# Patient Record
Sex: FEMALE | Race: WHITE | NOT HISPANIC OR LATINO | Employment: OTHER | URBAN - METROPOLITAN AREA
[De-identification: names, ages, dates, MRNs, and addresses within clinical notes are randomized per-mention and may not be internally consistent; named-entity substitution may affect disease eponyms.]

---

## 2017-10-25 PROBLEM — I51.9 DECREASED LEFT VENTRICULAR FUNCTION: Status: ACTIVE | Noted: 2017-10-25

## 2017-11-08 PROBLEM — I25.10 CORONARY ARTERY DISEASE, NON-OCCLUSIVE: Status: ACTIVE | Noted: 2017-11-08

## 2017-11-08 PROBLEM — R94.30 ELEVATED LEFT VENTRICULAR END-DIASTOLIC PRESSURE (LVEDP): Status: ACTIVE | Noted: 2017-11-08

## 2018-11-27 PROBLEM — R33.9 RETENTION OF URINE, UNSPECIFIED: Status: ACTIVE | Noted: 2018-11-27

## 2018-11-27 PROBLEM — R15.9 FECAL INCONTINENCE: Status: ACTIVE | Noted: 2018-11-27

## 2019-04-26 ENCOUNTER — TELEPHONE (OUTPATIENT)
Dept: FAMILY MEDICINE | Facility: CLINIC | Age: 74
End: 2019-04-26

## 2019-04-26 DIAGNOSIS — M81.0 AGE-RELATED OSTEOPOROSIS WITHOUT CURRENT PATHOLOGICAL FRACTURE: Primary | ICD-10-CM

## 2019-04-26 NOTE — TELEPHONE ENCOUNTER
----- Message from Anamika Lopez sent at 4/26/2019  4:15 PM CDT -----  Contact:  DR. Swain of  [facility]   Dr. Swain of [facility] need to speak to nurse in office regarding patient       Please call Dr. Swain [phone]

## 2019-04-26 NOTE — TELEPHONE ENCOUNTER
Callback to Dr Swain, mutual patient had a recent serious fracture and they are screening for osteoporosis, states patient needs bone mineral density scan (has not had 1 in more than 2 years)    Asking if we do order then please call and let Dr Swain know.    Orders pended for your signature if you agree with recommendation

## 2019-06-24 ENCOUNTER — PATIENT OUTREACH (OUTPATIENT)
Dept: ADMINISTRATIVE | Facility: HOSPITAL | Age: 74
End: 2019-06-24

## 2019-06-24 NOTE — PROGRESS NOTES
Health Maintenance Due   Topic Date Due    Hepatitis C Screening  1945    High Dose Statin  11/29/1968    Shingles Vaccine (1 of 2) 11/29/1997    LDCT Lung Screen  11/29/2002    Pneumococcal Vaccine (65+ Low/Medium Risk) (1 of 2 - PCV13) 11/29/2012    DEXA SCAN  07/12/2019     Chart review completed 06/24/2019  Future Appointments   Date Time Provider Department Center   7/8/2019  1:40 PM Rajat Alonzo MD [facility]   12/6/2019 11:30 AM Bertrand Greenberg III, MD [facility]ARD [facility] Card

## 2019-07-25 ENCOUNTER — TELEPHONE (OUTPATIENT)
Dept: FAMILY MEDICINE | Facility: CLINIC | Age: 74
End: 2019-07-25

## 2019-07-25 NOTE — TELEPHONE ENCOUNTER
----- Message from Arias Denney sent at 7/25/2019  2:50 PM CDT -----  Contact: patient  Type:  Patient Returning Call    Who Called:  patient  Who Left Message for Patient:  Mago  Does the patient know what this is regarding?:  Not sure  Best Call Back Number:  [phone]  Additional Information:  Requesting a call back

## 2019-09-06 ENCOUNTER — PATIENT OUTREACH (OUTPATIENT)
Dept: ADMINISTRATIVE | Facility: HOSPITAL | Age: 74
End: 2019-09-06

## 2019-09-19 ENCOUNTER — OFFICE VISIT (OUTPATIENT)
Dept: FAMILY MEDICINE | Facility: CLINIC | Age: 74
End: 2019-09-19
Payer: MEDICARE

## 2019-09-19 VITALS
SYSTOLIC BLOOD PRESSURE: 130 MMHG | DIASTOLIC BLOOD PRESSURE: 70 MMHG | HEART RATE: 59 BPM | OXYGEN SATURATION: 96 % | RESPIRATION RATE: 16 BRPM | TEMPERATURE: 98 F | WEIGHT: 150.81 LBS | HEIGHT: 64 IN | BODY MASS INDEX: 25.75 KG/M2

## 2019-09-19 DIAGNOSIS — Z78.0 MENOPAUSE: ICD-10-CM

## 2019-09-19 DIAGNOSIS — J44.9 CHRONIC OBSTRUCTIVE PULMONARY DISEASE, UNSPECIFIED COPD TYPE: ICD-10-CM

## 2019-09-19 DIAGNOSIS — E03.4 HYPOTHYROIDISM DUE TO ACQUIRED ATROPHY OF THYROID: ICD-10-CM

## 2019-09-19 DIAGNOSIS — I42.8 NICM (NONISCHEMIC CARDIOMYOPATHY): ICD-10-CM

## 2019-09-19 DIAGNOSIS — E78.00 HYPERCHOLESTEROLEMIA: ICD-10-CM

## 2019-09-19 DIAGNOSIS — E21.3 HYPERPARATHYROIDISM, UNSPECIFIED: ICD-10-CM

## 2019-09-19 DIAGNOSIS — I10 ESSENTIAL HYPERTENSION: ICD-10-CM

## 2019-09-19 DIAGNOSIS — Z00.00 WELLNESS EXAMINATION: Primary | ICD-10-CM

## 2019-09-19 PROCEDURE — 3075F PR MOST RECENT SYSTOLIC BLOOD PRESS GE 130-139MM HG: ICD-10-PCS | Mod: S$GLB,,, | Performed by: INTERNAL MEDICINE

## 2019-09-19 PROCEDURE — 3078F PR MOST RECENT DIASTOLIC BLOOD PRESSURE < 80 MM HG: ICD-10-PCS | Mod: S$GLB,,, | Performed by: INTERNAL MEDICINE

## 2019-09-19 PROCEDURE — 3078F DIAST BP <80 MM HG: CPT | Mod: S$GLB,,, | Performed by: INTERNAL MEDICINE

## 2019-09-19 PROCEDURE — 99999 PR PBB SHADOW E&M-EST. PATIENT-LVL IV: ICD-10-PCS | Mod: PBBFAC,,, | Performed by: INTERNAL MEDICINE

## 2019-09-19 PROCEDURE — 3075F SYST BP GE 130 - 139MM HG: CPT | Mod: S$GLB,,, | Performed by: INTERNAL MEDICINE

## 2019-09-19 PROCEDURE — 99397 PER PM REEVAL EST PAT 65+ YR: CPT | Mod: S$GLB,,, | Performed by: INTERNAL MEDICINE

## 2019-09-19 PROCEDURE — 99397 PR PREVENTIVE VISIT,EST,65 & OVER: ICD-10-PCS | Mod: S$GLB,,, | Performed by: INTERNAL MEDICINE

## 2019-09-19 PROCEDURE — 99999 PR PBB SHADOW E&M-EST. PATIENT-LVL IV: CPT | Mod: PBBFAC,,, | Performed by: INTERNAL MEDICINE

## 2019-09-19 NOTE — PROGRESS NOTES
Patient ID: Gretta Landry     Chief Complaint:   Chief Complaint   Patient presents with    Follow-up        HPI: New Patient to [facility] but known to me. Wellness exam. Complains of cough and fatigue and now epistaxis since starting Coreg. I have contacted Dr. Greenberg who recommended switching to Metoprolol. I think she's getting too much B-blocker, so we will cut it in 1/2. Needs labs. We discussed LDLCT for lung cancer screening, but will address later. Past Medical History of hypercalcemia- could be from HCTZ. Will repeat labs and consider stopping it.     Review of Systems   Constitutional: Negative.  Negative for activity change and unexpected weight change.   HENT: Positive for rhinorrhea. Negative for hearing loss and trouble swallowing.    Eyes: Negative.  Negative for discharge and visual disturbance.   Respiratory: Negative.  Negative for chest tightness and wheezing.    Cardiovascular: Negative.  Negative for chest pain and palpitations.   Gastrointestinal: Negative.  Negative for blood in stool, constipation, diarrhea and vomiting.   Endocrine: Negative.  Negative for polydipsia and polyuria.   Genitourinary: Negative.  Negative for difficulty urinating, dysuria, hematuria and menstrual problem.   Musculoskeletal: Negative.  Negative for arthralgias, joint swelling and neck pain.   Skin: Negative.    Allergic/Immunologic: Negative.    Neurological: Negative.  Negative for weakness and headaches.   Hematological: Negative.    Psychiatric/Behavioral: Negative.  Negative for confusion and dysphoric mood.   All other systems reviewed and are negative.         Objective:      Physical Exam   Physical Exam   Constitutional: She is oriented to person, place, and time. She appears well-developed and well-nourished.   HENT:   Head: Normocephalic and atraumatic.   Nose: Nose normal.   Mouth/Throat: Oropharynx is clear and moist.   Eyes: Pupils are equal, round, and reactive to light. Conjunctivae and EOM are  normal.   Neck: Normal range of motion. Neck supple.   Cardiovascular: Regular rhythm, normal heart sounds and intact distal pulses.   bradycardia   Pulmonary/Chest: Effort normal and breath sounds normal.   Abdominal: Soft. Bowel sounds are normal.   Musculoskeletal: Normal range of motion.   Neurological: She is alert and oriented to person, place, and time.   Skin: Skin is warm and dry. Capillary refill takes less than 2 seconds.   Psychiatric: She has a normal mood and affect. Her behavior is normal. Judgment and thought content normal.   Nursing note and vitals reviewed.       Current Outpatient Medications:     ascorbic acid, vitamin C, (VITAMIN C) 1000 MG tablet, Take 500 mg by mouth once daily., Disp: , Rfl:     aspirin (ECOTRIN) 81 MG EC tablet, Take 81 mg by mouth once daily., Disp: , Rfl:     carvedilol (COREG) 12.5 MG tablet, Take 1 tablet (12.5 mg total) by mouth 2 (two) times daily with meals., Disp: 60 tablet, Rfl: 11    cholecalciferol, vitamin D3, 10,000 unit Tab, Take 10,000 Units by mouth once a week. , Disp: , Rfl:     fish oil-omega-3 fatty acids 300-1,000 mg capsule, Take 2 g by mouth every other day. , Disp: , Rfl:     glucosamine-chondroitin 500-400 mg tablet, Take 1 tablet by mouth as needed., Disp: , Rfl:     hydroCHLOROthiazide (HYDRODIURIL) 25 MG tablet, Take 1 tablet (25 mg total) by mouth once daily., Disp: 30 tablet, Rfl: 11    levothyroxine (SYNTHROID) 25 MCG tablet, Take 1 tablet (25 mcg total) by mouth before breakfast., Disp: 90 tablet, Rfl: 0    magnesium oxide (MAG-OX) 400 mg tablet, Take 400 mg by mouth every evening. , Disp: , Rfl:     meclizine (ANTIVERT) 25 mg tablet, Take 1 tablet (25 mg total) by mouth 3 (three) times daily as needed for Dizziness or Nausea (VERTIGO)., Disp: 20 tablet, Rfl: 0    niacin 500 MG CpSR, Take 1,000 mg by mouth every other day. , Disp: , Rfl:     valsartan (DIOVAN) 320 MG tablet, Take 1 tablet (320 mg total) by mouth once daily., Disp:  "90 tablet, Rfl: 3  No current facility-administered medications for this visit.         Vitals:   Vitals:    09/19/19 1406   BP: 130/70   BP Location: Right arm   Patient Position: Sitting   BP Method: Large (Manual)   Pulse: (!) 59   Resp: 16   Temp: 98.4 °F (36.9 °C)   SpO2: 96%   Weight: 68.4 kg (150 lb 12.7 oz)   Height: 5' 4" (1.626 m)      Assessment:       Patient Active Problem List    Diagnosis Date Noted    Hyperparathyroidism, unspecified 09/19/2019    c Hypercholesterolemia     Retention of urine, unspecified 11/27/2018    Fecal incontinence 11/27/2018    Vitamin D deficiency     Coronary artery disease, non-occlusive 11/08/2017    Elevated left ventricular end-diastolic pressure (LVEDP) 11/08/2017    Chronic Obstructive Pulmonary Disease     Bilateral Lower Extremity Varicose Veins     Family H/O Colon Cancer     H/O 1 PPD X 30 YRs TUD, Quit In 2006     Hypertension     Hypothyroidism     Frequent Urinary Tract Infections     GERD     H/O Right Wrist Fracture Repair In 11/2015     Osteoporosis     Nonischemic Cardiomyopathy With EF 40-45%     Left Bundle Branch Block     Allergic rhinosinusitis     H/O Bladder Suspension Surgery With Sling In 2014     Sigmoid And Descending Colon Diverticulosis     H/O Colon Polyps On Previous TC     Decreased left ventricular function 10/25/2017    Lower extremity weakness 06/27/2016    Distal radius fracture 11/10/2015          Plan:       Gretta was seen today for follow-up.    Diagnoses and all orders for this visit:    Wellness examination    c Hypercholesterolemia  -     Lipid panel; Future  -     Comprehensive metabolic panel; Future    Hyperparathyroidism, unspecified  Monitor     NICM (nonischemic cardiomyopathy)  Per [facility], decrease Coreg to 6.25 mg twice daily     Chronic obstructive pulmonary disease, unspecified COPD type  Consider Albuterol inhaler if needed   Consider Low Dose Lung CT for Lung Cancer Screening     Essential " hypertension  -     CBC auto differential; Future  -     Hepatitis C antibody; Future  Controlled     Menopause  -     DXA Bone Density Spine And Hip; Future    Hypothyroidism due to acquired atrophy of thyroid  -     TSH; Future  -     T3, free; Future  -     T4, free; Future

## 2019-10-01 ENCOUNTER — HOSPITAL ENCOUNTER (OUTPATIENT)
Dept: RADIOLOGY | Facility: HOSPITAL | Age: 74
Discharge: HOME | End: 2019-10-01
Attending: INTERNAL MEDICINE
Payer: MEDICARE

## 2019-10-01 DIAGNOSIS — Z78.0 MENOPAUSE: ICD-10-CM

## 2019-10-01 PROCEDURE — 77080 DXA BONE DENSITY AXIAL: CPT | Mod: TC,PO

## 2019-10-01 PROCEDURE — 77080 DXA BONE DENSITY AXIAL: CPT | Mod: 26,,, | Performed by: RADIOLOGY

## 2019-10-01 PROCEDURE — 77080 DEXA BONE DENSITY SPINE HIP: ICD-10-PCS | Mod: 26,,, | Performed by: RADIOLOGY

## 2019-10-03 ENCOUNTER — PATIENT MESSAGE (OUTPATIENT)
Dept: FAMILY MEDICINE | Facility: CLINIC | Age: 74
End: 2019-10-03

## 2019-10-11 ENCOUNTER — TELEPHONE (OUTPATIENT)
Dept: FAMILY MEDICINE | Facility: CLINIC | Age: 74
End: 2019-10-11

## 2019-10-12 NOTE — TELEPHONE ENCOUNTER
Thankfully your previously high calcium is now normal. In fact, Fosamax can lower calcium. I haven't heard about the association with a stroke. I also want to be careful and prevent further bone loss.

## 2019-10-12 NOTE — TELEPHONE ENCOUNTER
----- Message from Krissy L. Sandifer, LPN sent at 10/8/2019  9:49 AM CDT -----  Notes recorded by Alesha Barnett MA on 10/7/2019 at 11:20 AM CDT  Spoke with PT about results.Pt states she does not want to start fosamax because of the high calcium and she states she does not want to have stroke so she wants to be extra careful.

## 2019-10-14 NOTE — TELEPHONE ENCOUNTER
That's a lot of calcium to take each day. I only suggest Calcium 1200 mg / day + Vit D 2000 units / day.

## 2019-11-14 ENCOUNTER — PES CALL (OUTPATIENT)
Dept: ADMINISTRATIVE | Facility: CLINIC | Age: 74
End: 2019-11-14

## 2020-05-05 ENCOUNTER — PATIENT MESSAGE (OUTPATIENT)
Dept: ADMINISTRATIVE | Facility: HOSPITAL | Age: 75
End: 2020-05-05

## 2021-01-20 ENCOUNTER — IMMUNIZATION (OUTPATIENT)
Dept: FAMILY MEDICINE | Facility: CLINIC | Age: 76
End: 2021-01-20
Payer: MEDICARE

## 2021-01-20 DIAGNOSIS — Z23 NEED FOR VACCINATION: Primary | ICD-10-CM

## 2021-01-20 PROCEDURE — 91300 COVID-19, MRNA, LNP-S, PF, 30 MCG/0.3 ML DOSE VACCINE: CPT | Mod: PBBFAC,PO

## 2021-02-10 ENCOUNTER — IMMUNIZATION (OUTPATIENT)
Dept: FAMILY MEDICINE | Facility: CLINIC | Age: 76
End: 2021-02-10
Payer: MEDICARE

## 2021-02-10 DIAGNOSIS — Z23 NEED FOR VACCINATION: Primary | ICD-10-CM

## 2021-02-10 PROCEDURE — 91300 COVID-19, MRNA, LNP-S, PF, 30 MCG/0.3 ML DOSE VACCINE: CPT | Mod: PBBFAC,PO

## 2021-02-10 PROCEDURE — 0002A COVID-19, MRNA, LNP-S, PF, 30 MCG/0.3 ML DOSE VACCINE: CPT | Mod: PBBFAC,PO

## 2021-03-12 PROBLEM — I51.89 DIASTOLIC DYSFUNCTION: Status: ACTIVE | Noted: 2017-11-08

## 2021-03-17 PROBLEM — G25.2 RESTING TREMOR: Status: ACTIVE | Noted: 2021-03-17

## 2021-04-27 PROBLEM — I51.9 DECREASED LEFT VENTRICULAR FUNCTION: Status: RESOLVED | Noted: 2017-10-25 | Resolved: 2021-04-27

## 2021-10-06 ENCOUNTER — IMMUNIZATION (OUTPATIENT)
Dept: FAMILY MEDICINE | Facility: CLINIC | Age: 76
End: 2021-10-06
Payer: MEDICARE

## 2021-10-06 DIAGNOSIS — Z23 NEED FOR VACCINATION: Primary | ICD-10-CM

## 2021-10-06 PROCEDURE — 91300 COVID-19, MRNA, LNP-S, PF, 30 MCG/0.3 ML DOSE VACCINE: CPT | Mod: PBBFAC | Performed by: FAMILY MEDICINE

## 2021-10-06 PROCEDURE — 0003A COVID-19, MRNA, LNP-S, PF, 30 MCG/0.3 ML DOSE VACCINE: CPT | Mod: PBBFAC | Performed by: FAMILY MEDICINE

## 2023-07-06 ENCOUNTER — OFFICE VISIT (OUTPATIENT)
Dept: PAIN MEDICINE | Facility: CLINIC | Age: 78
End: 2023-07-06
Payer: MEDICARE

## 2023-07-06 ENCOUNTER — HOSPITAL ENCOUNTER (OUTPATIENT)
Dept: RADIOLOGY | Facility: HOSPITAL | Age: 78
Discharge: HOME | End: 2023-07-06
Attending: ANESTHESIOLOGY
Payer: MEDICARE

## 2023-07-06 VITALS
HEIGHT: 62 IN | WEIGHT: 152.13 LBS | SYSTOLIC BLOOD PRESSURE: 158 MMHG | DIASTOLIC BLOOD PRESSURE: 72 MMHG | HEART RATE: 80 BPM | BODY MASS INDEX: 27.99 KG/M2

## 2023-07-06 DIAGNOSIS — M54.2 CERVICALGIA: ICD-10-CM

## 2023-07-06 DIAGNOSIS — M54.9 DORSALGIA, UNSPECIFIED: ICD-10-CM

## 2023-07-06 DIAGNOSIS — M54.9 DORSALGIA, UNSPECIFIED: Primary | ICD-10-CM

## 2023-07-06 PROCEDURE — 3077F SYST BP >= 140 MM HG: CPT | Mod: CPTII,S$GLB,, | Performed by: ANESTHESIOLOGY

## 2023-07-06 PROCEDURE — 3077F PR MOST RECENT SYSTOLIC BLOOD PRESSURE >= 140 MM HG: ICD-10-PCS | Mod: CPTII,S$GLB,, | Performed by: ANESTHESIOLOGY

## 2023-07-06 PROCEDURE — 3078F PR MOST RECENT DIASTOLIC BLOOD PRESSURE < 80 MM HG: ICD-10-PCS | Mod: CPTII,S$GLB,, | Performed by: ANESTHESIOLOGY

## 2023-07-06 PROCEDURE — 3288F PR FALLS RISK ASSESSMENT DOCUMENTED: ICD-10-PCS | Mod: CPTII,S$GLB,, | Performed by: ANESTHESIOLOGY

## 2023-07-06 PROCEDURE — 3288F FALL RISK ASSESSMENT DOCD: CPT | Mod: CPTII,S$GLB,, | Performed by: ANESTHESIOLOGY

## 2023-07-06 PROCEDURE — 99204 OFFICE O/P NEW MOD 45 MIN: CPT | Mod: S$GLB,,, | Performed by: ANESTHESIOLOGY

## 2023-07-06 PROCEDURE — 72114 XR LUMBAR SPINE 5 VIEW WITH FLEX AND EXT: ICD-10-PCS | Mod: 26,,, | Performed by: RADIOLOGY

## 2023-07-06 PROCEDURE — 99204 PR OFFICE/OUTPT VISIT, NEW, LEVL IV, 45-59 MIN: ICD-10-PCS | Mod: S$GLB,,, | Performed by: ANESTHESIOLOGY

## 2023-07-06 PROCEDURE — 1160F RVW MEDS BY RX/DR IN RCRD: CPT | Mod: CPTII,S$GLB,, | Performed by: ANESTHESIOLOGY

## 2023-07-06 PROCEDURE — 1159F MED LIST DOCD IN RCRD: CPT | Mod: CPTII,S$GLB,, | Performed by: ANESTHESIOLOGY

## 2023-07-06 PROCEDURE — 3044F HG A1C LEVEL LT 7.0%: CPT | Mod: CPTII,S$GLB,, | Performed by: ANESTHESIOLOGY

## 2023-07-06 PROCEDURE — 3078F DIAST BP <80 MM HG: CPT | Mod: CPTII,S$GLB,, | Performed by: ANESTHESIOLOGY

## 2023-07-06 PROCEDURE — 99999 PR PBB SHADOW E&M-EST. PATIENT-LVL IV: CPT | Mod: PBBFAC,,, | Performed by: ANESTHESIOLOGY

## 2023-07-06 PROCEDURE — 3044F PR MOST RECENT HEMOGLOBIN A1C LEVEL <7.0%: ICD-10-PCS | Mod: CPTII,S$GLB,, | Performed by: ANESTHESIOLOGY

## 2023-07-06 PROCEDURE — 99999 PR PBB SHADOW E&M-EST. PATIENT-LVL IV: ICD-10-PCS | Mod: PBBFAC,,, | Performed by: ANESTHESIOLOGY

## 2023-07-06 PROCEDURE — 1101F PR PT FALLS ASSESS DOC 0-1 FALLS W/OUT INJ PAST YR: ICD-10-PCS | Mod: CPTII,S$GLB,, | Performed by: ANESTHESIOLOGY

## 2023-07-06 PROCEDURE — 72052 XR CERVICAL SPINE 5 VIEW WITH FLEX AND EXT: ICD-10-PCS | Mod: 26,,, | Performed by: RADIOLOGY

## 2023-07-06 PROCEDURE — 1101F PT FALLS ASSESS-DOCD LE1/YR: CPT | Mod: CPTII,S$GLB,, | Performed by: ANESTHESIOLOGY

## 2023-07-06 PROCEDURE — 1160F PR REVIEW ALL MEDS BY PRESCRIBER/CLIN PHARMACIST DOCUMENTED: ICD-10-PCS | Mod: CPTII,S$GLB,, | Performed by: ANESTHESIOLOGY

## 2023-07-06 PROCEDURE — 1159F PR MEDICATION LIST DOCUMENTED IN MEDICAL RECORD: ICD-10-PCS | Mod: CPTII,S$GLB,, | Performed by: ANESTHESIOLOGY

## 2023-07-06 PROCEDURE — 72052 X-RAY EXAM NECK SPINE 6/>VWS: CPT | Mod: TC,PO

## 2023-07-06 PROCEDURE — 72114 X-RAY EXAM L-S SPINE BENDING: CPT | Mod: TC,PO

## 2023-07-06 PROCEDURE — 72114 X-RAY EXAM L-S SPINE BENDING: CPT | Mod: 26,,, | Performed by: RADIOLOGY

## 2023-07-06 PROCEDURE — 1125F PR PAIN SEVERITY QUANTIFIED, PAIN PRESENT: ICD-10-PCS | Mod: CPTII,S$GLB,, | Performed by: ANESTHESIOLOGY

## 2023-07-06 PROCEDURE — 1125F AMNT PAIN NOTED PAIN PRSNT: CPT | Mod: CPTII,S$GLB,, | Performed by: ANESTHESIOLOGY

## 2023-07-06 PROCEDURE — 72052 X-RAY EXAM NECK SPINE 6/>VWS: CPT | Mod: 26,,, | Performed by: RADIOLOGY

## 2023-07-06 NOTE — H&P (VIEW-ONLY)
[facility] New Patient Evaluation      Referred by: Dr. Scott Denton    PCP:     CC:   Chief Complaint   Patient presents with    Back Pain    Mid-back Pain    Low-back Pain      No flowsheet data found.      HPI:   Gretta Landry is a 77 y.o. female patient who has a past medical history of a Left Bundle Branch Block, a Nonischemic Cardiomyopathy With EF 40-45%, b Hypertension, c Hypercholesterolemia, e Hypothyroidism, f Osteoporosis, i Chronic Obstructive Pulmonary Disease, i H/O 1 PPD X 30 YRs TUD, Quit In 2006, j Family H/O Colon Cancer, j GERD, j H/O Colon Polyps On Previous TC, j Sigmoid And Descending Colon Diverticulosis, k Frequent Urinary Tract Infections, k H/O Bladder Suspension Surgery With Sling In 2014, l H/O Right Wrist Fracture Repair In 11/2015, o Allergic Rhinosinusitis, q Bilateral Lower Extremity Varicose Veins, q Vitamin D Deficiency, Sinus congestion, and Wellness Visit 12/1/16. She presents with back pain.  Had chronic back pain for over 10 years.  She reports over the past 6-12 months it has been gradually worsening.  She reports pain that radiates down her left leg the top of the left foot.  Her pain is 6/10, constant, deep, burning.  Pain is worse with standing, bending, touching, walking, lifting and relieved with rest, lying down, medications.  She does take some tramadol but it only provides her with about 10% relief.  She reports she has some feelings of chronic weakness.  She can intermittently gets some numbness down her left arm.      Pain Intervention History:      Past Spine Surgical History:      Past and current medications:  Antineuropathics:  NSAIDs:  Physical therapy: yes, completed   Antidepressants:  Muscle relaxers:  Opioids: tramadol   Antiplatelets/Anticoagulants: aspirin     History:    Current Outpatient Medications:     aspirin (ECOTRIN) 81 MG EC tablet, Take 81 mg by mouth once daily., Disp: , Rfl:     cholecalciferol, vitamin D3, 10,000 unit Tab,  "Take 10,000 Units by mouth once a week. , Disp: , Rfl:     fish oil-omega-3 fatty acids 300-1,000 mg capsule, Take 2 g by mouth every other day. , Disp: , Rfl:     glucosamine-chondroitin 500-400 mg tablet, Take 1 tablet by mouth as needed., Disp: , Rfl:     meclizine (ANTIVERT) 25 mg tablet, Take 1 tablet (25 mg total) by mouth 3 (three) times daily as needed for Dizziness or Nausea (VERTIGO)., Disp: 20 tablet, Rfl: 0    niacin 500 MG CpSR, Take 1,000 mg by mouth 2 (two) times a day., Disp: , Rfl:     valsartan-hydrochlorothiazide (DIOVAN-HCT) 320-25 mg per tablet, Take 1 tablet by mouth once daily., Disp: 90 tablet, Rfl: 3    verapamiL (CALAN-SR) 120 MG CR tablet, Take 1 tablet (120 mg total) by mouth every evening., Disp: 90 tablet, Rfl: 3    levothyroxine (SYNTHROID) 25 MCG tablet, Take 1 tablet (25 mcg total) by mouth before breakfast., Disp: 90 tablet, Rfl: 0    Past Medical History:   Diagnosis Date    a Left Bundle Branch Block     Dr. Bertrand Greenberg    a Nonischemic Cardiomyopathy With EF 40-45%     Dr. Bertrand Greenberg; 10/25/17 LHC/Angiography = (See Report)    b Hypertension     5/29/18 RXd A Low Salt Diet; Norvasc Caused Severe Edema    c Hypercholesterolemia     On Niacin 500 Mg Daily    e Hypothyroidism     f Osteoporosis     i Chronic Obstructive Pulmonary Disease     i H/O 1 PPD X 30 YRs TUD, Quit In 2006     j Family H/O Colon Cancer     11/8/17 Referred To Dr. Fernie Grace; Her Sister    j GERD     11/8/17 Referred To Dr. Fernie Grace; Dr. Loretta Quiroz    j H/O Colon Polyps On Previous TC     11/8/17 Referred To Dr. Fernie Grace; Dr. Loretta Quiroz (After Her 1/21/10 TC Was Free Of Polyps): "Repeat TC In 5 YRs"    j Sigmoid And Descending Colon Diverticulosis     11/8/17 Referred To Dr. Fernie Grace; Dr. Loretta ledesma Frequent Urinary Tract Infections     k H/O Bladder Suspension Surgery With Sling In 2014     l H/O Right Wrist Fracture Repair In 11/2015     Dr. Dylan Packer; Injuries Due To A " Fall On 11/2/15    o Allergic Rhinosinusitis     q Bilateral Lower Extremity Varicose Veins     q Vitamin D Deficiency     On OTC D3 10K IU Daily    Sinus congestion     Wellness Visit 16     Was Performed By Dr. Teran       Past Surgical History:   Procedure Laterality Date    APPENDECTOMY      CARDIAC CATHETERIZATION  2006    LHC, no stents    CATARACT EXTRACTION, BILATERAL  2019    Cysto, bladder lift with sling and robotic sacral colpopexy-14      HYSTERECTOMY      IMPLANTATION OF PERMANENT SACRAL NERVE STIMULATOR N/A 2018    Procedure: INSERTION, NEUROSTIMULATOR, PERMANENT, SACRAL;  Surgeon: Jean Carlos Denton MD;  Location: [facility];  Service: Urology;  Laterality: N/A;    OOPHORECTOMY      right wrist      Fracture    VAGINAL HYSTERECTOMY W/ ANTERIOR AND POSTERIOR VAGINAL REPAIR      and BSO       Family History   Problem Relation Age of Onset    No Known Problems Mother     Heart disease Father     Hyperlipidemia Father     Hypertension Father     Stroke Father     Ovarian cancer Sister     Lung cancer Brother     Alcohol abuse Brother     Dementia Sister        Social History     Socioeconomic History    Marital status:    Tobacco Use    Smoking status: Former     Packs/day: 1.00     Years: 30.00     Pack years: 30.00     Types: Cigarettes     Quit date: 2006     Years since quittin.0    Smokeless tobacco: Never   Substance and Sexual Activity    Alcohol use: Yes     Alcohol/week: 1.0 standard drink     Types: 1 Glasses of wine per week     Comment: rare    Drug use: No    Sexual activity: Yes     Partners: Male     Birth control/protection: Post-menopausal       Review of patient's allergies indicates:   Allergen Reactions    Ace inhibitors Other (See Comments)     cough    Pneumococcal vaccine Rash       Review of Systems:  Positive for anxiety, depression.  Balance of review of systems is negative.    Physical Exam:  Vitals:    23 1010   BP: (!) 158/72   Pulse: 80  "  Weight: 69 kg (152 lb 1.9 oz)   Height: 5' 2" (1.575 m)   PainSc:   6   PainLoc: Back     Body mass index is 27.82 kg/m².    Gen: NAD  Psych: mood appropriate for given condition  HEENT: eyes anicteric   CV: RRR  HEENT: anicteric   Respiratory: non-labored, no signs of respiratory distress  Abd: non-distended  Skin: warm, dry and intact.  Gait: antalgic gait.     Sensory:  Intact and symmetrical to light touch in C4-T1 dermatomes bilaterally. Intact and symmetrical to light touch in L1-S1 dermatomes bilaterally.    Motor:    Right Left   C4 Shoulder Abduction  5  5   C5 Elbow Flexion    5  5   C6 Wrist Extension  5  5   C7 Elbow Extension   5  5   C8/T1 Hand Intrinsics   5  5        Right Left   L2/3 Iliacus Hip flexion  5  5   L3/4 Qudratus Femoris Knee Extension  5  5   L4/5 Tib Anterior Ankle Dorsiflexion   5  5   L5/S1 Extensor Hallicus Longus Great toe extension  5  5   S1/S2 Gastroc/Soleus Plantar Flexion  5  5      Right Left                  Patellar DTR 0 0   Achilles DTR 0 0   Zhou Absent  Absent                 Labs:  Lab Results   Component Value Date    HGBA1C 5.5 02/27/2023       Lab Results   Component Value Date    WBC 5.97 02/27/2023    HGB 11.6 (L) 02/27/2023    HCT 34.6 (L) 02/27/2023    MCV 93 02/27/2023     02/27/2023           Imaging:  CT cervical spine 4/24/21  FINDINGS:   No acute fracture or traumatic subluxation.    Vertebral bodies are normal in height. There is straightening of the normal cervical lordosis which is likely positional or due to muscular spasm.  Mild multilevel facet hypertrophy is present.     Assessment:   Problem List Items Addressed This Visit    None  Visit Diagnoses       Dorsalgia, unspecified    -  Primary    Relevant Orders    CT Lumbar Spine Without Contrast    X-Ray Lumbar Complete Including Flex And Ext    Cervicalgia        Relevant Orders    CT Cervical Spine Without Contrast    X-Ray Cervical Spine 5 View With Flex And Ext              Gretta H " Frantz is a 77 y.o. female patient who has a past medical history of a Left Bundle Branch Block, a Nonischemic Cardiomyopathy With EF 40-45%, b Hypertension, c Hypercholesterolemia, e Hypothyroidism, f Osteoporosis, i Chronic Obstructive Pulmonary Disease, i H/O 1 PPD X 30 YRs TUD, Quit In 2006, j Family H/O Colon Cancer, j GERD, j H/O Colon Polyps On Previous TC, j Sigmoid And Descending Colon Diverticulosis, k Frequent Urinary Tract Infections, k H/O Bladder Suspension Surgery With Sling In 2014, l H/O Right Wrist Fracture Repair In 11/2015, o Allergic Rhinosinusitis, q Bilateral Lower Extremity Varicose Veins, q Vitamin D Deficiency, Sinus congestion, and Wellness Visit 12/1/16. She presents with back pain.  Had chronic back pain for over 10 years.  She reports over the past 6-12 months it has been gradually worsening.  She reports pain that radiates down her left leg the top of the left foot.  Her pain is 6/10, constant, deep, burning.  Pain is worse with standing, bending, touching, walking, lifting and relieved with rest, lying down, medications.  She does take some tramadol but it only provides her with about 10% relief.  She reports she has some feelings of chronic weakness.  She can intermittently gets some numbness down her left arm.    - on exam she has full strength in her upper and lower extremities.  Intact sensation to light touch.  Mild pain with lumbar axial facet loading  - she is with her son and daughter today.  They both report as well as the patient reports that she has completed physical therapy multiple times without significant improvement in her symptoms  - I have ordered x-rays of her lumbar and cervical spine with flexion-extension to evaluate her bony anatomy and rule out any instability  - I think that her left-sided leg pain is likely lumbar radicular pain.  I have ordered a CT of her lumbar spine to better evaluate her neural anatomy as her bladder stimulator is not MRI compatible.  I  am also going to order a CT of her cervical spine to try and evaluate her neural anatomy given her complaints of intermittent left-sided arm numbness  - we will reach out once imaging complete to discuss further treatment options      : Not applicable    Marcell Hope M.D.  Interventional Pain Medicine / Anesthesiology    This note was completed with dictation software and grammatical errors may exist.

## 2023-07-06 NOTE — PROGRESS NOTES
[facility] New Patient Evaluation      Referred by: Dr. Scott Denton    PCP:     CC:   Chief Complaint   Patient presents with    Back Pain    Mid-back Pain    Low-back Pain      No flowsheet data found.      HPI:   Gretta Landry is a 77 y.o. female patient who has a past medical history of a Left Bundle Branch Block, a Nonischemic Cardiomyopathy With EF 40-45%, b Hypertension, c Hypercholesterolemia, e Hypothyroidism, f Osteoporosis, i Chronic Obstructive Pulmonary Disease, i H/O 1 PPD X 30 YRs TUD, Quit In 2006, j Family H/O Colon Cancer, j GERD, j H/O Colon Polyps On Previous TC, j Sigmoid And Descending Colon Diverticulosis, k Frequent Urinary Tract Infections, k H/O Bladder Suspension Surgery With Sling In 2014, l H/O Right Wrist Fracture Repair In 11/2015, o Allergic Rhinosinusitis, q Bilateral Lower Extremity Varicose Veins, q Vitamin D Deficiency, Sinus congestion, and Wellness Visit 12/1/16. She presents with back pain.  Had chronic back pain for over 10 years.  She reports over the past 6-12 months it has been gradually worsening.  She reports pain that radiates down her left leg the top of the left foot.  Her pain is 6/10, constant, deep, burning.  Pain is worse with standing, bending, touching, walking, lifting and relieved with rest, lying down, medications.  She does take some tramadol but it only provides her with about 10% relief.  She reports she has some feelings of chronic weakness.  She can intermittently gets some numbness down her left arm.      Pain Intervention History:      Past Spine Surgical History:      Past and current medications:  Antineuropathics:  NSAIDs:  Physical therapy: yes, completed   Antidepressants:  Muscle relaxers:  Opioids: tramadol   Antiplatelets/Anticoagulants: aspirin     History:    Current Outpatient Medications:     aspirin (ECOTRIN) 81 MG EC tablet, Take 81 mg by mouth once daily., Disp: , Rfl:     cholecalciferol, vitamin D3, 10,000 unit Tab,  "Take 10,000 Units by mouth once a week. , Disp: , Rfl:     fish oil-omega-3 fatty acids 300-1,000 mg capsule, Take 2 g by mouth every other day. , Disp: , Rfl:     glucosamine-chondroitin 500-400 mg tablet, Take 1 tablet by mouth as needed., Disp: , Rfl:     meclizine (ANTIVERT) 25 mg tablet, Take 1 tablet (25 mg total) by mouth 3 (three) times daily as needed for Dizziness or Nausea (VERTIGO)., Disp: 20 tablet, Rfl: 0    niacin 500 MG CpSR, Take 1,000 mg by mouth 2 (two) times a day., Disp: , Rfl:     valsartan-hydrochlorothiazide (DIOVAN-HCT) 320-25 mg per tablet, Take 1 tablet by mouth once daily., Disp: 90 tablet, Rfl: 3    verapamiL (CALAN-SR) 120 MG CR tablet, Take 1 tablet (120 mg total) by mouth every evening., Disp: 90 tablet, Rfl: 3    levothyroxine (SYNTHROID) 25 MCG tablet, Take 1 tablet (25 mcg total) by mouth before breakfast., Disp: 90 tablet, Rfl: 0    Past Medical History:   Diagnosis Date    a Left Bundle Branch Block     Dr. Bertrand Greenberg    a Nonischemic Cardiomyopathy With EF 40-45%     Dr. Bertrand Greenberg; 10/25/17 LHC/Angiography = (See Report)    b Hypertension     5/29/18 RXd A Low Salt Diet; Norvasc Caused Severe Edema    c Hypercholesterolemia     On Niacin 500 Mg Daily    e Hypothyroidism     f Osteoporosis     i Chronic Obstructive Pulmonary Disease     i H/O 1 PPD X 30 YRs TUD, Quit In 2006     j Family H/O Colon Cancer     11/8/17 Referred To Dr. Fernie Grace; Her Sister    j GERD     11/8/17 Referred To Dr. Fernie Grace; Dr. Loretta Quiroz    j H/O Colon Polyps On Previous TC     11/8/17 Referred To Dr. Fernie Grace; Dr. Loretta Quiroz (After Her 1/21/10 TC Was Free Of Polyps): "Repeat TC In 5 YRs"    j Sigmoid And Descending Colon Diverticulosis     11/8/17 Referred To Dr. Fernie Grace; Dr. Loretta ledesma Frequent Urinary Tract Infections     k H/O Bladder Suspension Surgery With Sling In 2014     l H/O Right Wrist Fracture Repair In 11/2015     Dr. Dylan Packer; Injuries Due To A " Fall On 11/2/15    o Allergic Rhinosinusitis     q Bilateral Lower Extremity Varicose Veins     q Vitamin D Deficiency     On OTC D3 10K IU Daily    Sinus congestion     Wellness Visit 16     Was Performed By Dr. Teran       Past Surgical History:   Procedure Laterality Date    APPENDECTOMY      CARDIAC CATHETERIZATION  2006    LHC, no stents    CATARACT EXTRACTION, BILATERAL  2019    Cysto, bladder lift with sling and robotic sacral colpopexy-14      HYSTERECTOMY      IMPLANTATION OF PERMANENT SACRAL NERVE STIMULATOR N/A 2018    Procedure: INSERTION, NEUROSTIMULATOR, PERMANENT, SACRAL;  Surgeon: Jean Carlos Denton MD;  Location: [facility];  Service: Urology;  Laterality: N/A;    OOPHORECTOMY      right wrist      Fracture    VAGINAL HYSTERECTOMY W/ ANTERIOR AND POSTERIOR VAGINAL REPAIR      and BSO       Family History   Problem Relation Age of Onset    No Known Problems Mother     Heart disease Father     Hyperlipidemia Father     Hypertension Father     Stroke Father     Ovarian cancer Sister     Lung cancer Brother     Alcohol abuse Brother     Dementia Sister        Social History     Socioeconomic History    Marital status:    Tobacco Use    Smoking status: Former     Packs/day: 1.00     Years: 30.00     Pack years: 30.00     Types: Cigarettes     Quit date: 2006     Years since quittin.0    Smokeless tobacco: Never   Substance and Sexual Activity    Alcohol use: Yes     Alcohol/week: 1.0 standard drink     Types: 1 Glasses of wine per week     Comment: rare    Drug use: No    Sexual activity: Yes     Partners: Male     Birth control/protection: Post-menopausal       Review of patient's allergies indicates:   Allergen Reactions    Ace inhibitors Other (See Comments)     cough    Pneumococcal vaccine Rash       Review of Systems:  Positive for anxiety, depression.  Balance of review of systems is negative.    Physical Exam:  Vitals:    23 1010   BP: (!) 158/72   Pulse: 80  "  Weight: 69 kg (152 lb 1.9 oz)   Height: 5' 2" (1.575 m)   PainSc:   6   PainLoc: Back     Body mass index is 27.82 kg/m².    Gen: NAD  Psych: mood appropriate for given condition  HEENT: eyes anicteric   CV: RRR  HEENT: anicteric   Respiratory: non-labored, no signs of respiratory distress  Abd: non-distended  Skin: warm, dry and intact.  Gait: antalgic gait.     Sensory:  Intact and symmetrical to light touch in C4-T1 dermatomes bilaterally. Intact and symmetrical to light touch in L1-S1 dermatomes bilaterally.    Motor:    Right Left   C4 Shoulder Abduction  5  5   C5 Elbow Flexion    5  5   C6 Wrist Extension  5  5   C7 Elbow Extension   5  5   C8/T1 Hand Intrinsics   5  5        Right Left   L2/3 Iliacus Hip flexion  5  5   L3/4 Qudratus Femoris Knee Extension  5  5   L4/5 Tib Anterior Ankle Dorsiflexion   5  5   L5/S1 Extensor Hallicus Longus Great toe extension  5  5   S1/S2 Gastroc/Soleus Plantar Flexion  5  5      Right Left                  Patellar DTR 0 0   Achilles DTR 0 0   Zhou Absent  Absent                 Labs:  Lab Results   Component Value Date    HGBA1C 5.5 02/27/2023       Lab Results   Component Value Date    WBC 5.97 02/27/2023    HGB 11.6 (L) 02/27/2023    HCT 34.6 (L) 02/27/2023    MCV 93 02/27/2023     02/27/2023           Imaging:  CT cervical spine 4/24/21  FINDINGS:   No acute fracture or traumatic subluxation.    Vertebral bodies are normal in height. There is straightening of the normal cervical lordosis which is likely positional or due to muscular spasm.  Mild multilevel facet hypertrophy is present.     Assessment:   Problem List Items Addressed This Visit    None  Visit Diagnoses       Dorsalgia, unspecified    -  Primary    Relevant Orders    CT Lumbar Spine Without Contrast    X-Ray Lumbar Complete Including Flex And Ext    Cervicalgia        Relevant Orders    CT Cervical Spine Without Contrast    X-Ray Cervical Spine 5 View With Flex And Ext              Gretta H " Frantz is a 77 y.o. female patient who has a past medical history of a Left Bundle Branch Block, a Nonischemic Cardiomyopathy With EF 40-45%, b Hypertension, c Hypercholesterolemia, e Hypothyroidism, f Osteoporosis, i Chronic Obstructive Pulmonary Disease, i H/O 1 PPD X 30 YRs TUD, Quit In 2006, j Family H/O Colon Cancer, j GERD, j H/O Colon Polyps On Previous TC, j Sigmoid And Descending Colon Diverticulosis, k Frequent Urinary Tract Infections, k H/O Bladder Suspension Surgery With Sling In 2014, l H/O Right Wrist Fracture Repair In 11/2015, o Allergic Rhinosinusitis, q Bilateral Lower Extremity Varicose Veins, q Vitamin D Deficiency, Sinus congestion, and Wellness Visit 12/1/16. She presents with back pain.  Had chronic back pain for over 10 years.  She reports over the past 6-12 months it has been gradually worsening.  She reports pain that radiates down her left leg the top of the left foot.  Her pain is 6/10, constant, deep, burning.  Pain is worse with standing, bending, touching, walking, lifting and relieved with rest, lying down, medications.  She does take some tramadol but it only provides her with about 10% relief.  She reports she has some feelings of chronic weakness.  She can intermittently gets some numbness down her left arm.    - on exam she has full strength in her upper and lower extremities.  Intact sensation to light touch.  Mild pain with lumbar axial facet loading  - she is with her son and daughter today.  They both report as well as the patient reports that she has completed physical therapy multiple times without significant improvement in her symptoms  - I have ordered x-rays of her lumbar and cervical spine with flexion-extension to evaluate her bony anatomy and rule out any instability  - I think that her left-sided leg pain is likely lumbar radicular pain.  I have ordered a CT of her lumbar spine to better evaluate her neural anatomy as her bladder stimulator is not MRI compatible.  I  am also going to order a CT of her cervical spine to try and evaluate her neural anatomy given her complaints of intermittent left-sided arm numbness  - we will reach out once imaging complete to discuss further treatment options      : Not applicable    Marcell Hope M.D.  Interventional Pain Medicine / Anesthesiology    This note was completed with dictation software and grammatical errors may exist.

## 2023-07-11 ENCOUNTER — HOSPITAL ENCOUNTER (OUTPATIENT)
Dept: RADIOLOGY | Facility: HOSPITAL | Age: 78
Discharge: HOME | End: 2023-07-11
Attending: ANESTHESIOLOGY
Payer: MEDICARE

## 2023-07-11 DIAGNOSIS — M54.9 DORSALGIA, UNSPECIFIED: ICD-10-CM

## 2023-07-11 DIAGNOSIS — M54.2 CERVICALGIA: ICD-10-CM

## 2023-07-11 PROCEDURE — 72125 CT CERVICAL SPINE WITHOUT CONTRAST: ICD-10-PCS | Mod: 26,,, | Performed by: RADIOLOGY

## 2023-07-11 PROCEDURE — 72125 CT NECK SPINE W/O DYE: CPT | Mod: TC,PO

## 2023-07-11 PROCEDURE — 72131 CT LUMBAR SPINE W/O DYE: CPT | Mod: TC,PO

## 2023-07-11 PROCEDURE — 72131 CT LUMBAR SPINE W/O DYE: CPT | Mod: 26,,, | Performed by: RADIOLOGY

## 2023-07-11 PROCEDURE — 72131 CT LUMBAR SPINE WITHOUT CONTRAST: ICD-10-PCS | Mod: 26,,, | Performed by: RADIOLOGY

## 2023-07-11 PROCEDURE — 72125 CT NECK SPINE W/O DYE: CPT | Mod: 26,,, | Performed by: RADIOLOGY

## 2023-07-18 ENCOUNTER — TELEPHONE (OUTPATIENT)
Dept: PAIN MEDICINE | Facility: CLINIC | Age: 78
End: 2023-07-18
Payer: MEDICARE

## 2023-07-18 NOTE — TELEPHONE ENCOUNTER
I spoke to Ms. Walden and reviewed her imaging.    We are going to schedule for a lumbar FUNMILAYO          Please schedule her for Monday July 31st        Physician - Dr Hope    Type of Procedure/Injection - Lumbar Epidural  L5/S1           Laterality - NA      Type of Sedation - Local      Need to hold medication - Yes      Aspirin for 7 days      Clearance needed - No      Follow up - 2 week

## 2023-07-20 DIAGNOSIS — M54.16 LUMBAR RADICULOPATHY: Primary | ICD-10-CM

## 2023-07-20 RX ORDER — ALPRAZOLAM 0.5 MG/1
0.5 TABLET, ORALLY DISINTEGRATING ORAL ONCE AS NEEDED
Status: CANCELLED | OUTPATIENT
Start: 2023-07-20 | End: 2034-12-16

## 2023-07-26 ENCOUNTER — TELEPHONE (OUTPATIENT)
Dept: SURGERY | Facility: HOSPITAL | Age: 78
End: 2023-07-26
Payer: MEDICARE

## 2023-07-26 NOTE — TELEPHONE ENCOUNTER
Good Afternoon,    I see that Ms. Glynn takes Fish-Oil, has she been instructed to hold this as well for the procedure? I see where she was instructed to hold her Aspirin but not the fish oil. If we do want to hold, how long?     Thank you!

## 2023-07-27 NOTE — TELEPHONE ENCOUNTER
She can start holding fish oil now she likes.  But I am okay to do the injection as long as she is been holding her aspirin appropriately

## 2023-07-31 ENCOUNTER — HOSPITAL ENCOUNTER (OUTPATIENT)
Facility: HOSPITAL | Age: 78
Discharge: HOME | End: 2023-07-31
Attending: ANESTHESIOLOGY | Admitting: ANESTHESIOLOGY
Payer: MEDICARE

## 2023-07-31 ENCOUNTER — HOSPITAL ENCOUNTER (OUTPATIENT)
Dept: RADIOLOGY | Facility: HOSPITAL | Age: 78
Discharge: HOME | End: 2023-07-31
Attending: ANESTHESIOLOGY | Admitting: ANESTHESIOLOGY
Payer: MEDICARE

## 2023-07-31 DIAGNOSIS — M54.16 LUMBAR RADICULOPATHY: Primary | ICD-10-CM

## 2023-07-31 DIAGNOSIS — M54.50 LOWER BACK PAIN: ICD-10-CM

## 2023-07-31 PROCEDURE — 62323 PR INJ LUMBAR/SACRAL, W/IMAGING GUIDANCE: ICD-10-PCS | Mod: ,,, | Performed by: ANESTHESIOLOGY

## 2023-07-31 PROCEDURE — 62323 NJX INTERLAMINAR LMBR/SAC: CPT | Mod: PO | Performed by: ANESTHESIOLOGY

## 2023-07-31 PROCEDURE — 25000003 PHARM REV CODE 250: Mod: PO | Performed by: ANESTHESIOLOGY

## 2023-07-31 PROCEDURE — 76000 FLUOROSCOPY <1 HR PHYS/QHP: CPT | Mod: TC,PO

## 2023-07-31 PROCEDURE — 25500020 PHARM REV CODE 255: Mod: PO | Performed by: ANESTHESIOLOGY

## 2023-07-31 PROCEDURE — 62323 NJX INTERLAMINAR LMBR/SAC: CPT | Mod: ,,, | Performed by: ANESTHESIOLOGY

## 2023-07-31 PROCEDURE — 63600175 PHARM REV CODE 636 W HCPCS: Mod: PO | Performed by: ANESTHESIOLOGY

## 2023-07-31 RX ORDER — TRIAMCINOLONE ACETONIDE 40 MG/ML
INJECTION, SUSPENSION INTRA-ARTICULAR; INTRAMUSCULAR
Status: DISCONTINUED | OUTPATIENT
Start: 2023-07-31 | End: 2023-07-31 | Stop reason: HOSPADM

## 2023-07-31 RX ORDER — LIDOCAINE HYDROCHLORIDE 10 MG/ML
INJECTION, SOLUTION EPIDURAL; INFILTRATION; INTRACAUDAL; PERINEURAL
Status: DISCONTINUED | OUTPATIENT
Start: 2023-07-31 | End: 2023-07-31 | Stop reason: HOSPADM

## 2023-07-31 RX ORDER — ALPRAZOLAM 0.5 MG/1
0.5 TABLET, ORALLY DISINTEGRATING ORAL ONCE AS NEEDED
Status: DISCONTINUED | OUTPATIENT
Start: 2023-07-31 | End: 2023-07-31 | Stop reason: HOSPADM

## 2023-07-31 NOTE — DISCHARGE SUMMARY
[facility]  Discharge Note  Short Stay    Admit Date: 7/31/2023    Discharge Date: 7/31/2023    Attending Physician: Marcell Hope     Discharge Provider: Marcell Hope    Diagnoses:  There are no hospital problems to display for this patient.      Discharged Condition: Good    Final Diagnoses: Lumbar radiculopathy [M54.16]    Disposition: Home or Self Care    Hospital Course: No complications, uneventful    Outcome of Hospitalization, Treatment, Procedure, or Surgery:  Patient was admitted for outpatient interventional pain management procedure. The patient tolerated the procedure well with no complications.    Follow up/Patient Instructions:  Follow up as scheduled in Pain Management office in 2-3 weeks.  Patient has received instructions and follow up date and time.    Medications:  Continue previous medications, except restart aspirin in 24 hours    Discharge Procedure Orders   Notify your health care provider if you experience any of the following:  temperature >100.4     Notify your health care provider if you experience any of the following:  persistent nausea and vomiting or diarrhea     Notify your health care provider if you experience any of the following:  severe uncontrolled pain     Notify your health care provider if you experience any of the following:  redness, tenderness, or signs of infection (pain, swelling, redness, odor or green/yellow discharge around incision site)     Notify your health care provider if you experience any of the following:  difficulty breathing or increased cough     Notify your health care provider if you experience any of the following:  severe persistent headache     Notify your health care provider if you experience any of the following:  worsening rash     Notify your health care provider if you experience any of the following:  persistent dizziness, light-headedness, or visual disturbances     Notify your health care provider if you experience any of the  following:  increased confusion or weakness     Activity as tolerated

## 2023-07-31 NOTE — INTERVAL H&P NOTE
The patient has been examined and the H&P has been reviewed:    I concur with the findings and no changes have occurred since H&P was written.  CT reviewed.  She has at least moderate central canal narrowing at L3-4 and L4-5. We will proceed with L5-S1 FUNMILAYO. The risks and benefits of this intervention, and alternative therapies were discussed with the patient.  The discussion of risks included infection, bleeding, need for additional procedures or surgery, nerve damage.  Questions regarding the procedure, risks, expected outcome, and possible side effects were solicited and answered to the patient's satisfaction.  Sarah Landry wishes to proceed with the injection or procedure.  Written consent was obtained.      There are no hospital problems to display for this patient.

## 2023-07-31 NOTE — PLAN OF CARE
Vital signs stable. Discharge instructions reviewed with patient. Questions answered. Verbalized understanding.

## 2023-07-31 NOTE — OP NOTE
"Procedure Note    Procedure Date: 7/31/2023    Procedure Performed:  L5/S1 lumbar interlaminar epidural steroid injection under fluoroscopy.    Indications:  Gretta Landry presents with lumbar radiculitis/radiculopathy secondary to disc herniation, osteophyte/osteophyte complexes, and/or severe degenerative disc disease producing foraminal or central spinal stenosis.  The pain has been present for at least 4 weeks and the patient has failed to respond to noninvasive conservative care.  Pain rated by NRS at baseline prior to intervention is 6/10.  Their radiculitis/radiculopathy and/or neurogenic claudication is severe enough to greatly impact their quality of life or function.     Pre-op diagnosis: Lumbar Radiculopathy    Post-op diagnosis: same    Physician: Marcell Hope MD    IV anxiolysis medications: none    Medications injected: depomedrol 80mg, 1% Lidocaine 1ml, 2 mL sterile, preservative-free normal saline.    Local anesthetic used: 1% Lidocaine, 1 ml    Estimated Blood Loss: none    Complications:  none    Technique:  The patient was interviewed in the holding area and Risks/Benefits were discussed, including, but not limited to, the possibility of new or different pain, bleeding or infection.   All questions were answered.  The patient understood and accepted risks.  Consent was verfied.  A time-out was taken to identify patient and procedure prior to starting the procedure. The patient was placed in the prone position on the fluoroscopy table. The area of the lumbar spine was prepped with Chloraprep x2 and draped in a sterile manner. The L5/S1 interspace was identified and marked under AP fluoroscopy. The skin and subcutaneous tissues overlying the targeted interspace were anesthetized with 3-5 mL of 1% lidocaine using a 25G, 1.5" needle.  A 20G, 3.5" Tuohy epidural needle was directed toward the interspace under fluoroscopic guidance until the ligamentum flavum was engaged. From this point, a loss of " resistance technique with a glass syringe and saline was used to identify entrance of the needle into the epidural space. Once loss of resistance was observed 1 mL of contrast solution was injected. An appropriate epidurogram was noted.  A 4 mL mixture consisting of saline, 1 mL 1% Lidocaine and 80 mg of depomedrol was injected slowly and without resistance.  The needle was flushed with normal saline and removed. The contrast was seen to be displaced after injection. Patient was awake/responsive during all injections.  The patient tolerated the procedure well and was transferred to the [facility] in stable condition.  The patient was monitored after the procedure and was given post-procedure and discharge instructions to follow at home. The patient was discharged in a stable condition.

## 2023-08-01 VITALS
RESPIRATION RATE: 18 BRPM | SYSTOLIC BLOOD PRESSURE: 176 MMHG | HEIGHT: 62 IN | WEIGHT: 154 LBS | HEART RATE: 68 BPM | DIASTOLIC BLOOD PRESSURE: 79 MMHG | BODY MASS INDEX: 28.34 KG/M2 | OXYGEN SATURATION: 96 % | TEMPERATURE: 98 F

## 2023-08-29 ENCOUNTER — OFFICE VISIT (OUTPATIENT)
Dept: PAIN MEDICINE | Facility: CLINIC | Age: 78
End: 2023-08-29
Payer: MEDICARE

## 2023-08-29 VITALS
WEIGHT: 150.81 LBS | HEIGHT: 62 IN | BODY MASS INDEX: 27.75 KG/M2 | SYSTOLIC BLOOD PRESSURE: 172 MMHG | DIASTOLIC BLOOD PRESSURE: 77 MMHG | HEART RATE: 69 BPM

## 2023-08-29 DIAGNOSIS — M54.16 LUMBAR RADICULOPATHY: Primary | ICD-10-CM

## 2023-08-29 DIAGNOSIS — M54.9 DORSALGIA, UNSPECIFIED: ICD-10-CM

## 2023-08-29 PROCEDURE — 1159F PR MEDICATION LIST DOCUMENTED IN MEDICAL RECORD: ICD-10-PCS | Mod: CPTII,S$GLB,, | Performed by: ANESTHESIOLOGY

## 2023-08-29 PROCEDURE — 1159F MED LIST DOCD IN RCRD: CPT | Mod: CPTII,S$GLB,, | Performed by: ANESTHESIOLOGY

## 2023-08-29 PROCEDURE — 3077F PR MOST RECENT SYSTOLIC BLOOD PRESSURE >= 140 MM HG: ICD-10-PCS | Mod: CPTII,S$GLB,, | Performed by: ANESTHESIOLOGY

## 2023-08-29 PROCEDURE — 3288F PR FALLS RISK ASSESSMENT DOCUMENTED: ICD-10-PCS | Mod: CPTII,S$GLB,, | Performed by: ANESTHESIOLOGY

## 2023-08-29 PROCEDURE — 99214 PR OFFICE/OUTPT VISIT, EST, LEVL IV, 30-39 MIN: ICD-10-PCS | Mod: S$GLB,,, | Performed by: ANESTHESIOLOGY

## 2023-08-29 PROCEDURE — 1125F PR PAIN SEVERITY QUANTIFIED, PAIN PRESENT: ICD-10-PCS | Mod: CPTII,S$GLB,, | Performed by: ANESTHESIOLOGY

## 2023-08-29 PROCEDURE — 1125F AMNT PAIN NOTED PAIN PRSNT: CPT | Mod: CPTII,S$GLB,, | Performed by: ANESTHESIOLOGY

## 2023-08-29 PROCEDURE — 3044F PR MOST RECENT HEMOGLOBIN A1C LEVEL <7.0%: ICD-10-PCS | Mod: CPTII,S$GLB,, | Performed by: ANESTHESIOLOGY

## 2023-08-29 PROCEDURE — 3044F HG A1C LEVEL LT 7.0%: CPT | Mod: CPTII,S$GLB,, | Performed by: ANESTHESIOLOGY

## 2023-08-29 PROCEDURE — 1101F PR PT FALLS ASSESS DOC 0-1 FALLS W/OUT INJ PAST YR: ICD-10-PCS | Mod: CPTII,S$GLB,, | Performed by: ANESTHESIOLOGY

## 2023-08-29 PROCEDURE — 3078F PR MOST RECENT DIASTOLIC BLOOD PRESSURE < 80 MM HG: ICD-10-PCS | Mod: CPTII,S$GLB,, | Performed by: ANESTHESIOLOGY

## 2023-08-29 PROCEDURE — 3078F DIAST BP <80 MM HG: CPT | Mod: CPTII,S$GLB,, | Performed by: ANESTHESIOLOGY

## 2023-08-29 PROCEDURE — 99999 PR PBB SHADOW E&M-EST. PATIENT-LVL III: CPT | Mod: PBBFAC,,, | Performed by: ANESTHESIOLOGY

## 2023-08-29 PROCEDURE — 3288F FALL RISK ASSESSMENT DOCD: CPT | Mod: CPTII,S$GLB,, | Performed by: ANESTHESIOLOGY

## 2023-08-29 PROCEDURE — 99999 PR PBB SHADOW E&M-EST. PATIENT-LVL III: ICD-10-PCS | Mod: PBBFAC,,, | Performed by: ANESTHESIOLOGY

## 2023-08-29 PROCEDURE — 3077F SYST BP >= 140 MM HG: CPT | Mod: CPTII,S$GLB,, | Performed by: ANESTHESIOLOGY

## 2023-08-29 PROCEDURE — 1101F PT FALLS ASSESS-DOCD LE1/YR: CPT | Mod: CPTII,S$GLB,, | Performed by: ANESTHESIOLOGY

## 2023-08-29 PROCEDURE — 99214 OFFICE O/P EST MOD 30 MIN: CPT | Mod: S$GLB,,, | Performed by: ANESTHESIOLOGY

## 2023-08-29 NOTE — PROGRESS NOTES
[facility] Follow Up Evaluation      Referred by: No ref. provider found    PCP:     CC:   Chief Complaint   Patient presents with    Follow-up    Sciatica    Ankle Pain          8/29/2023    10:32 AM   Last 3 PDI Scores   Pain Disability Index (PDI) 7         Interval HPI 8/29/23: Ms. Landry office for follow up.  She is status post L5-S1 FUNMILAYO on 7/31/23 with 100% relief of her previous left-sided radicular pain.  She reports improvement in her mobility and her quality of life.  She is able to do things throughout the day with very minimal to no discomfort in her leg.  She reports she does still take 2 Tylenol day on an as needed basis that is working well for her.    HPI:   Gretta Landry is a 77 y.o. female patient who has a past medical history of a Left Bundle Branch Block, a Nonischemic Cardiomyopathy With EF 40-45%, b Hypertension, c Hypercholesterolemia, e Hypothyroidism, f Osteoporosis, i Chronic Obstructive Pulmonary Disease, i H/O 1 PPD X 30 YRs TUD, Quit In 2006, j Family H/O Colon Cancer, j GERD, j H/O Colon Polyps On Previous TC, j Sigmoid And Descending Colon Diverticulosis, k Frequent Urinary Tract Infections, k H/O Bladder Suspension Surgery With Sling In 2014, l H/O Right Wrist Fracture Repair In 11/2015, o Allergic Rhinosinusitis, q Bilateral Lower Extremity Varicose Veins, q Vitamin D Deficiency, Sinus congestion, and Wellness Visit 12/1/16. She presents with back pain.  Had chronic back pain for over 10 years.  She reports over the past 6-12 months it has been gradually worsening.  She reports pain that radiates down her left leg the top of the left foot.  Her pain is 6/10, constant, deep, burning.  Pain is worse with standing, bending, touching, walking, lifting and relieved with rest, lying down, medications.  She does take some tramadol but it only provides her with about 10% relief.  She reports she has some feelings of chronic weakness.  She can intermittently gets some numbness down  her left arm.      Pain Intervention History:  - s/p L5-S1 FUNMILAYO on 7/31/23 with 100% relief    Past Spine Surgical History:      Past and current medications:  Antineuropathics:  NSAIDs:  Physical therapy: yes, completed   Antidepressants:  Muscle relaxers:  Opioids: tramadol   Antiplatelets/Anticoagulants: aspirin     History:    Current Outpatient Medications:     aspirin (ECOTRIN) 81 MG EC tablet, Take 81 mg by mouth once daily., Disp: , Rfl:     cholecalciferol, vitamin D3, 10,000 unit Tab, Take 10,000 Units by mouth once a week. , Disp: , Rfl:     fish oil-omega-3 fatty acids 300-1,000 mg capsule, Take 2 g by mouth every other day. , Disp: , Rfl:     glucosamine-chondroitin 500-400 mg tablet, Take 1 tablet by mouth as needed., Disp: , Rfl:     levothyroxine (SYNTHROID) 25 MCG tablet, Take 1 tablet (25 mcg total) by mouth before breakfast., Disp: 90 tablet, Rfl: 0    niacin 500 MG CpSR, Take 1,000 mg by mouth 2 (two) times a day., Disp: , Rfl:     valsartan-hydrochlorothiazide (DIOVAN-HCT) 320-25 mg per tablet, Take 1 tablet by mouth once daily., Disp: 90 tablet, Rfl: 3    verapamiL (CALAN-SR) 120 MG CR tablet, Take 1 tablet (120 mg total) by mouth every evening., Disp: 90 tablet, Rfl: 3    Past Medical History:   Diagnosis Date    a Left Bundle Branch Block     Dr. Bertrand Greenberg    a Nonischemic Cardiomyopathy With EF 40-45%     Dr. Bertrand Greenberg; 10/25/17 LHC/Angiography = (See Report)    b Hypertension     5/29/18 RXd A Low Salt Diet; Norvasc Caused Severe Edema    c Hypercholesterolemia     On Niacin 500 Mg Daily    e Hypothyroidism     f Osteoporosis     i Chronic Obstructive Pulmonary Disease     i H/O 1 PPD X 30 YRs TUD, Quit In 2006     j Family H/O Colon Cancer     11/8/17 Referred To Dr. Fernie Garce; Her Sister    j GERD     11/8/17 Referred To Dr. Fernie rGace; Dr. Loretta Quiroz    j H/O Colon Polyps On Previous TC     11/8/17 Referred To Dr. Fernie Grace; Dr. Loretta Quiroz (After Her 1/21/10 TC Was  "Free Of Polyps): "Repeat TC In 5 YRs"    j Sigmoid And Descending Colon Diverticulosis     11/8/17 Referred To Dr. Fernie Grace; Dr. Loretta ledesma Frequent Urinary Tract Infections     k H/O Bladder Suspension Surgery With Sling In 2014     l H/O Right Wrist Fracture Repair In 11/2015     Dr. Dylan Packer; Injuries Due To A Fall On 11/2/15    o Allergic Rhinosinusitis     q Bilateral Lower Extremity Varicose Veins     q Vitamin D Deficiency     On OTC D3 10K IU Daily    Sinus congestion     Wellness Visit 12/1/16     Was Performed By Dr. Teran       Past Surgical History:   Procedure Laterality Date    APPENDECTOMY      CARDIAC CATHETERIZATION  2006    LHC, no stents    CATARACT EXTRACTION, BILATERAL  2019    Cysto, bladder lift with sling and robotic sacral colpopexy-1/31/14      EPIDURAL STEROID INJECTION INTO LUMBAR SPINE N/A 7/31/2023    Procedure: Injection-steroid-epidural-lumbar L5/S1;  Surgeon: Marcell Hope MD;  Location: [facility];  Service: Pain Management;  Laterality: N/A;    HYSTERECTOMY      IMPLANTATION OF PERMANENT SACRAL NERVE STIMULATOR N/A 11/27/2018    Procedure: INSERTION, NEUROSTIMULATOR, PERMANENT, SACRAL;  Surgeon: Jean Carlos Denton MD;  Location: [facility] OR;  Service: Urology;  Laterality: N/A;    OOPHORECTOMY      right wrist      Fracture    VAGINAL HYSTERECTOMY W/ ANTERIOR AND POSTERIOR VAGINAL REPAIR  2006    and BSO       Family History   Problem Relation Age of Onset    No Known Problems Mother     Heart disease Father     Hyperlipidemia Father     Hypertension Father     Stroke Father     Ovarian cancer Sister     Lung cancer Brother     Alcohol abuse Brother     Dementia Sister        Social History     Socioeconomic History    Marital status:    Tobacco Use    Smoking status: Former     Current packs/day: 0.00     Average packs/day: 1 pack/day for 30.0 years (30.0 ttl pk-yrs)     Types: Cigarettes     Start date: 6/9/1976     Quit date: 6/9/2006     Years since quitting: " 17.2    Smokeless tobacco: Never   Substance and Sexual Activity    Alcohol use: Yes     Alcohol/week: 1.0 standard drink of alcohol     Types: 1 Glasses of wine per week     Comment: rare    Drug use: No    Sexual activity: Yes     Partners: Male     Birth control/protection: Post-menopausal     Social Determinants of Health     Financial Resource Strain: Low Risk  (9/19/2019)    Overall Financial Resource Strain (CARDIA)     Difficulty of Paying Living Expenses: Not hard at all   Food Insecurity: No Food Insecurity (9/19/2019)    Hunger Vital Sign     Worried About Running Out of Food in the Last Year: Never true     Ran Out of Food in the Last Year: Never true   Transportation Needs: No Transportation Needs (9/19/2019)    PRAPARE - Transportation     Lack of Transportation (Medical): No     Lack of Transportation (Non-Medical): No   Physical Activity: Insufficiently Active (9/19/2019)    Exercise Vital Sign     Days of Exercise per Week: 4 days     Minutes of Exercise per Session: 30 min   Stress: No Stress Concern Present (9/18/2019)    [employer] - Occupational Stress Questionnaire     Feeling of Stress : Not at all   Social Connections: Unknown (9/18/2019)    Social Connection and Isolation Panel [NHANES]     Frequency of Communication with Friends and Family: More than three times a week     Frequency of Social Gatherings with Friends and Family: Once a week     Active Member of Clubs or Organizations: Yes     Attends Club or Organization Meetings: More than 4 times per year     Marital Status:        Review of patient's allergies indicates:   Allergen Reactions    Norco [hydrocodone-acetaminophen] Shortness Of Breath     Chest heaviness and SOB    Ace inhibitors Other (See Comments)     cough    Pneumococcal vaccine Rash       Review of Systems:  Positive for anxiety, depression.  Balance of review of systems is negative.    Physical Exam:  Vitals:    08/29/23 1028   BP:  "(!) 172/77   Pulse: 69   Weight: 68.4 kg (150 lb 12.7 oz)   Height: 5' 2" (1.575 m)   PainSc:   4   PainLoc: Back     Body mass index is 27.58 kg/m².    Gen: NAD  Psych: mood appropriate for given condition  HEENT: eyes anicteric   CV: RRR  HEENT: anicteric   Respiratory: non-labored, no signs of respiratory distress  Abd: non-distended  Skin: warm, dry and intact.  Gait: antalgic gait.     Sensory:  Intact and symmetrical to light touch in L1-S1 dermatomes bilaterally.    Motor:     Right Left   L2/3 Iliacus Hip flexion  5  5   L3/4 Qudratus Femoris Knee Extension  5  5   L4/5 Tib Anterior Ankle Dorsiflexion   5  5   L5/S1 Extensor Hallicus Longus Great toe extension  5  5   S1/S2 Gastroc/Soleus Plantar Flexion  5  5      Right Left                  Patellar DTR 0 0   Achilles DTR 0 0   Zhou Absent  Absent                 Labs:  Lab Results   Component Value Date    HGBA1C 5.5 02/27/2023       Lab Results   Component Value Date    WBC 5.97 02/27/2023    HGB 11.6 (L) 02/27/2023    HCT 34.6 (L) 02/27/2023    MCV 93 02/27/2023     02/27/2023           Imaging:  CT cervical spine 7/11/23  FINDINGS:  Bones: Vertebral body heights are preserved.  No fractures or bony destructive changes.     Alignment: Trace anterolisthesis of C4 on C5 and C5 on C6 with straightening of the expected normal cervical lordosis.     Craniocervical junction: Mild regional degenerative changes of the C1-C2 articulation.  No bony destructive changes or malalignment.     Disc levels:     C2-C3: Within normal limits.  No significant osseous spinal canal or foraminal narrowing.  C3-C4: Mild right-sided facet arthrosis.  Shallow central disc osteophyte complex.  The osseous spinal canal and foramina remain patent.  C4-C5: Mild bilateral facet arthrosis.  Shallow central disc osteophyte complex.  The osseous spinal canal and foramina remain patent.  C5-C6: Severe left-sided facet arthrosis with shallow uncovertebral spurring contributing " to moderate to severe left foraminal narrowing.  Mild right foraminal narrowing.  No substantial narrowing of the osseous spinal canal.  C6-C7: Mild bilateral facet arthrosis.  The osseous spinal canal and foramina are patent.  C7-T1: Within normal limits.  No significant osseous spinal canal or foraminal narrowing.     Paraspinal soft tissues: Mild emphysematous changes of the lung apices.    CT lumbar spine 7/11/23  FINDINGS:  Bones: Diffuse bony demineralization.  No fractures or bony destructive changes.  Endplate centered sclerosis and osteophyte production along the right lateral opposing endplates of L2-L3 in the setting of disc height loss discussed in further detail below.     Alignment: Prominent levo scoliotic curvature and left lateral subluxation of L2 on L3 with mild compensatory dextrocurvature of the lower lumbar spine.  Sagittal alignment is within normal limits without evidence of spondylolisthesis.     Disc levels:     T12-L1: Shallow disc bulging and mild bilateral facet arthrosis without substantial narrowing of the osseous spinal canal.  Mild to moderate right foraminal narrowing.  The left foramen is patent.  L1-L2: Shallow disc bulging without substantial narrowing of the osseous spinal canal.  Mild to moderate right foraminal narrowing.  The left foramen is patent.  L2-L3: Severe disc height loss asymmetric to the right.  Disc bulging and moderate facet arthrosis produce moderate to severe narrowing of the osseous spinal canal.  Severe right foraminal narrowing and mild left foraminal narrowing.  L3-L4: Disc bulging and mild-to-moderate facet arthrosis producing moderate narrowing of the spinal canal.  Lateralizing disc material contributes to moderate left foraminal narrowing.  Mild right foraminal narrowing.  L4-L5: Disc bulging and moderate facet arthrosis produces moderate narrowing of the osseous spinal canal.  Moderate to severe left and mild right foraminal narrowing.  L5-S1: Shallow  disc bulging and moderate bilateral facet arthrosis.  No substantial narrowing of the osseous spinal canal.  Moderate left foraminal narrowing.  The right foramen is patent.     Other: Right S2 sacral stimulator partially imaged.     Soft Tissues: Diverticulosis coli and atherosclerotic changes noted    Assessment:   Problem List Items Addressed This Visit    None  Visit Diagnoses       Lumbar radiculopathy    -  Primary    Dorsalgia, unspecified                    Gretta Landry is a 77 y.o. female patient who has a past medical history of a Left Bundle Branch Block, a Nonischemic Cardiomyopathy With EF 40-45%, b Hypertension, c Hypercholesterolemia, e Hypothyroidism, f Osteoporosis, i Chronic Obstructive Pulmonary Disease, i H/O 1 PPD X 30 YRs TUD, Quit In 2006, j Family H/O Colon Cancer, j GERD, j H/O Colon Polyps On Previous TC, j Sigmoid And Descending Colon Diverticulosis, k Frequent Urinary Tract Infections, k H/O Bladder Suspension Surgery With Sling In 2014, l H/O Right Wrist Fracture Repair In 11/2015, o Allergic Rhinosinusitis, q Bilateral Lower Extremity Varicose Veins, q Vitamin D Deficiency, Sinus congestion, and Wellness Visit 12/1/16. She presents with back pain.  Had chronic back pain for over 10 years.  She reports over the past 6-12 months it has been gradually worsening.  She reports pain that radiates down her left leg the top of the left foot.  Her pain is 6/10, constant, deep, burning.  Pain is worse with standing, bending, touching, walking, lifting and relieved with rest, lying down, medications.  She does take some tramadol but it only provides her with about 10% relief.  She reports she has some feelings of chronic weakness.  She can intermittently gets some numbness down her left arm.    8/29/23 - Ms. Landry office for follow up.  She is status post L5-S1 FUNMILAYO on 7/31/23 with 100% relief of her previous left-sided radicular pain.  She reports improvement in her mobility and her quality of  life.  She is able to do things throughout the day with very minimal to no discomfort in her leg.  She reports she does still take 2 Tylenol day on an as needed basis that is working well for her.  At this time I recommended to maintain conservative treatment.  She has completed formal physical therapy and is going to continue PT directed home exercises.  She has a recumbent bike at home that she is going to start using again.  She will follow up on an as needed basis if her pain should return      : Not applicable    Marcell Hope M.D.  Interventional Pain Medicine / Anesthesiology    This note was completed with dictation software and grammatical errors may exist.

## 2023-10-16 NOTE — TELEPHONE ENCOUNTER
Pt .states she starting to take calcium supplement 6000 x6/day and says she is being very carefull not to break a bone   Cheek-To-Nose Interpolation Flap Text: A decision was made to reconstruct the defect utilizing an interpolation axial flap and a staged reconstruction.  A telfa template was made of the defect.  This telfa template was then used to outline the Cheek-To-Nose Interpolation flap.  The donor area for the pedicle flap was then injected with anesthesia.  The flap was excised through the skin and subcutaneous tissue down to the layer of the underlying musculature.  The interpolation flap was carefully excised within this deep plane to maintain its blood supply.  The edges of the donor site were undermined.   The donor site was closed in a primary fashion.  The pedicle was then rotated into position and sutured.  Once the tube was sutured into place, adequate blood supply was confirmed with blanching and refill.  The pedicle was then wrapped with xeroform gauze and dressed appropriately with a telfa and gauze bandage to ensure continued blood supply and protect the attached pedicle.

## 2024-04-10 PROBLEM — E78.2 MIXED HYPERLIPIDEMIA: Status: ACTIVE | Noted: 2024-04-10

## 2024-04-10 PROBLEM — R33.9 RETENTION OF URINE, UNSPECIFIED: Status: RESOLVED | Noted: 2018-11-27 | Resolved: 2024-04-10

## 2024-04-10 PROBLEM — R42 VERTIGO: Status: ACTIVE | Noted: 2023-02-23

## 2024-04-10 PROBLEM — D64.9 ANEMIA: Status: ACTIVE | Noted: 2023-02-28

## 2024-04-10 PROBLEM — G25.2 RESTING TREMOR: Status: RESOLVED | Noted: 2021-03-17 | Resolved: 2024-04-10

## 2024-04-10 PROBLEM — I42.0 NONISCHEMIC CONGESTIVE CARDIOMYOPATHY: Status: RESOLVED | Noted: 2023-02-23 | Resolved: 2024-04-10

## 2024-04-10 PROBLEM — R15.9 FECAL INCONTINENCE: Status: RESOLVED | Noted: 2018-11-27 | Resolved: 2024-04-10

## 2024-04-10 PROBLEM — Z00.01 ABNORMAL WELLNESS EXAM: Status: ACTIVE | Noted: 2024-04-10

## 2024-04-10 PROBLEM — I42.0 NONISCHEMIC CONGESTIVE CARDIOMYOPATHY: Status: ACTIVE | Noted: 2023-02-23

## 2024-04-10 PROBLEM — I51.89 DIASTOLIC DYSFUNCTION: Status: RESOLVED | Noted: 2017-11-08 | Resolved: 2024-04-10

## 2024-04-26 PROBLEM — Z78.9 ENROLLED IN CHRONIC CARE MANAGEMENT: Status: ACTIVE | Noted: 2024-04-26

## 2024-05-08 ENCOUNTER — OFFICE VISIT (OUTPATIENT)
Dept: PAIN MEDICINE | Facility: CLINIC | Age: 79
End: 2024-05-08
Payer: MEDICARE

## 2024-05-08 ENCOUNTER — TELEPHONE (OUTPATIENT)
Dept: PAIN MEDICINE | Facility: CLINIC | Age: 79
End: 2024-05-08

## 2024-05-08 VITALS
HEART RATE: 71 BPM | WEIGHT: 149.38 LBS | HEIGHT: 64 IN | BODY MASS INDEX: 25.5 KG/M2 | SYSTOLIC BLOOD PRESSURE: 176 MMHG | DIASTOLIC BLOOD PRESSURE: 72 MMHG

## 2024-05-08 DIAGNOSIS — M54.16 LUMBAR RADICULOPATHY: Primary | ICD-10-CM

## 2024-05-08 DIAGNOSIS — M79.605 LEFT LEG PAIN: ICD-10-CM

## 2024-05-08 PROCEDURE — 99999 PR PBB SHADOW E&M-EST. PATIENT-LVL IV: CPT | Mod: PBBFAC,,, | Performed by: ANESTHESIOLOGY

## 2024-05-08 PROCEDURE — 1159F MED LIST DOCD IN RCRD: CPT | Mod: CPTII,S$GLB,, | Performed by: ANESTHESIOLOGY

## 2024-05-08 PROCEDURE — 1125F AMNT PAIN NOTED PAIN PRSNT: CPT | Mod: CPTII,S$GLB,, | Performed by: ANESTHESIOLOGY

## 2024-05-08 PROCEDURE — 3077F SYST BP >= 140 MM HG: CPT | Mod: CPTII,S$GLB,, | Performed by: ANESTHESIOLOGY

## 2024-05-08 PROCEDURE — 1160F RVW MEDS BY RX/DR IN RCRD: CPT | Mod: CPTII,S$GLB,, | Performed by: ANESTHESIOLOGY

## 2024-05-08 PROCEDURE — 99214 OFFICE O/P EST MOD 30 MIN: CPT | Mod: S$GLB,,, | Performed by: ANESTHESIOLOGY

## 2024-05-08 PROCEDURE — 3288F FALL RISK ASSESSMENT DOCD: CPT | Mod: CPTII,S$GLB,, | Performed by: ANESTHESIOLOGY

## 2024-05-08 PROCEDURE — 3078F DIAST BP <80 MM HG: CPT | Mod: CPTII,S$GLB,, | Performed by: ANESTHESIOLOGY

## 2024-05-08 PROCEDURE — 1101F PT FALLS ASSESS-DOCD LE1/YR: CPT | Mod: CPTII,S$GLB,, | Performed by: ANESTHESIOLOGY

## 2024-05-08 RX ORDER — ALPRAZOLAM 1 MG/1
1 TABLET, ORALLY DISINTEGRATING ORAL ONCE AS NEEDED
Status: CANCELLED | OUTPATIENT
Start: 2024-05-08 | End: 2035-10-05

## 2024-05-08 NOTE — TELEPHONE ENCOUNTER
Physician - Dr Hope    Type of Procedure/Injection - Lumbar Epidural  L5/S1           Laterality - NA      Anxiolysis- Local      Need to hold medication - Yes      Aspirin for 7 days      Clearance needed - No      Follow up - 3 week with praneeth sparrow

## 2024-05-08 NOTE — H&P (VIEW-ONLY)
[facility] Follow Up Evaluation      Referred by: No ref. provider found    PCP:     CC:   Chief Complaint   Patient presents with    Leg Pain     Left side, tingling/numbness present     Low-back Pain     Mid to low bilateral back pain radiating down left leg.    Mid-back Pain          5/8/2024    10:51 AM 8/29/2023    10:32 AM   Last 3 PDI Scores   Pain Disability Index (PDI) 11 7         Interval HPI 5/8/24: Ms. Landry returns to the office for follow up.  Today she reports return of her lower back pain.  Her pain is constant, sharp, shooting radiating from her lower back to her left leg to her left foot.  Denies any numbness or weakness.      HPI:   Gretta Landry is a 78 y.o. female patient who has a past medical history of a Left Bundle Branch Block, a Nonischemic Cardiomyopathy With EF 40-45%, b Hypertension, c Hypercholesterolemia, e Hypothyroidism, f Osteoporosis, i Chronic Obstructive Pulmonary Disease, i H/O 1 PPD X 30 YRs TUD, Quit In 2006, j Family H/O Colon Cancer, j GERD, j H/O Colon Polyps On Previous TC, j Sigmoid And Descending Colon Diverticulosis, k Frequent Urinary Tract Infections, k H/O Bladder Suspension Surgery With Sling In 2014, l H/O Right Wrist Fracture Repair In 11/2015, o Allergic Rhinosinusitis, q Bilateral Lower Extremity Varicose Veins, q Vitamin D Deficiency, Sinus congestion, and Wellness Visit 12/1/16. She presents with back pain.  Had chronic back pain for over 10 years.  She reports over the past 6-12 months it has been gradually worsening.  She reports pain that radiates down her left leg the top of the left foot.  Her pain is 6/10, constant, deep, burning.  Pain is worse with standing, bending, touching, walking, lifting and relieved with rest, lying down, medications.  She does take some tramadol but it only provides her with about 10% relief.  She reports she has some feelings of chronic weakness.  She can intermittently gets some numbness down her left  arm.      Pain Intervention History:  - s/p L5-S1 FUNMILAYO on 7/31/23 with 100% relief    Past Spine Surgical History:      Past and current medications:  Antineuropathics:  NSAIDs:  Physical therapy: yes, completed   Antidepressants:  Muscle relaxers:  Opioids: tramadol   Antiplatelets/Anticoagulants: aspirin     History:    Current Outpatient Medications:     aspirin (ECOTRIN) 81 MG EC tablet, Take 81 mg by mouth once daily., Disp: , Rfl:     cholecalciferol, vitamin D3, 10,000 unit Tab, Take 10,000 Units by mouth once a week. , Disp: , Rfl:     fish oil-omega-3 fatty acids 300-1,000 mg capsule, Take 2 g by mouth every other day. , Disp: , Rfl:     glucosamine-chondroitin 500-400 mg tablet, Take 1 tablet by mouth once daily., Disp: , Rfl:     niacin 500 MG CpSR, Take 1,000 mg by mouth 2 (two) times a day., Disp: , Rfl:     pantoprazole (PROTONIX) 40 MG tablet, Take 40 mg by mouth every other day., Disp: , Rfl:     valsartan-hydrochlorothiazide (DIOVAN-HCT) 320-25 mg per tablet, Take 1 tablet by mouth once daily., Disp: 90 tablet, Rfl: 3    verapamiL (CALAN-SR) 240 MG CR tablet, Take 1 tablet (240 mg total) by mouth every evening., Disp: 90 tablet, Rfl: 1    levothyroxine (SYNTHROID) 25 MCG tablet, Take 1 tablet (25 mcg total) by mouth before breakfast., Disp: 90 tablet, Rfl: 0    meclizine (ANTIVERT) 25 mg tablet, Take 25 mg by mouth 3 (three) times daily as needed. (Patient not taking: Reported on 5/8/2024), Disp: , Rfl:     Past Medical History:   Diagnosis Date    a Left Bundle Branch Block     Dr. Bertrand Greenberg    a Nonischemic Cardiomyopathy With EF 40-45%     Dr. Bertrand Greenberg; 10/25/17 LHC/Angiography = (See Report)    b Hypertension     5/29/18 RXd A Low Salt Diet; Norvasc Caused Severe Edema    c Hypercholesterolemia     On Niacin 500 Mg Daily    e Hypothyroidism     f Osteoporosis     i Chronic Obstructive Pulmonary Disease     i H/O 1 PPD X 30 YRs TUD, Quit In 2006     j Family H/O Colon Cancer     11/8/17  "Referred To Dr. Fernie Grace; Her Sister    j GERD     11/8/17 Referred To Dr. Fernie Grace; Dr. Loretta mcneal H/O Colon Polyps On Previous TC     11/8/17 Referred To Dr. Fernie Grace; Dr. Loretta Quiroz (After Her 1/21/10 TC Was Free Of Polyps): "Repeat TC In 5 YRs"    j Sigmoid And Descending Colon Diverticulosis     11/8/17 Referred To Dr. Fernie Grace; Dr. Loretta ledesma Frequent Urinary Tract Infections     k H/O Bladder Suspension Surgery With Sling In 2014     l H/O Right Wrist Fracture Repair In 11/2015     Dr. Dylan Packer; Injuries Due To A Fall On 11/2/15    o Allergic Rhinosinusitis     q Bilateral Lower Extremity Varicose Veins     q Vitamin D Deficiency     On OTC D3 10K IU Daily    Sinus congestion     Wellness Visit 12/1/16     Was Performed By Dr. Teran       Past Surgical History:   Procedure Laterality Date    APPENDECTOMY      CARDIAC CATHETERIZATION  2006    [facility], no stents    CATARACT EXTRACTION, BILATERAL  2019    Cysto, bladder lift with sling and robotic sacral colpopexy-1/31/14      EPIDURAL STEROID INJECTION INTO LUMBAR SPINE N/A 7/31/2023    Procedure: Injection-steroid-epidural-lumbar L5/S1;  Surgeon: Marcell Hope MD;  Location: [facility] OR;  Service: Pain Management;  Laterality: N/A;    HYSTERECTOMY      IMPLANTATION OF PERMANENT SACRAL NERVE STIMULATOR N/A 11/27/2018    Procedure: INSERTION, NEUROSTIMULATOR, PERMANENT, SACRAL;  Surgeon: Jean Carlos Denton MD;  Location: [facility] OR;  Service: Urology;  Laterality: N/A;    OOPHORECTOMY      right wrist      Fracture    VAGINAL HYSTERECTOMY W/ ANTERIOR AND POSTERIOR VAGINAL REPAIR  2006    and BSO       Family History   Problem Relation Name Age of Onset    No Known Problems Mother      Heart disease Father      Hyperlipidemia Father      Hypertension Father      Stroke Father      Ovarian cancer Sister      Lung cancer Brother      Alcohol abuse Brother      Dementia Sister         Social History     Socioeconomic History    " "Marital status:    Tobacco Use    Smoking status: Former     Current packs/day: 0.00     Average packs/day: 1 pack/day for 30.0 years (30.0 ttl pk-yrs)     Types: Cigarettes     Start date: 1976     Quit date: 2006     Years since quittin.9    Smokeless tobacco: Never   Substance and Sexual Activity    Alcohol use: Yes     Alcohol/week: 1.0 standard drink of alcohol     Types: 1 Glasses of wine per week     Comment: rare    Drug use: No    Sexual activity: Yes     Partners: Male     Birth control/protection: Post-menopausal     Social Determinants of Health     Financial Resource Strain: Low Risk  (2019)    Overall Financial Resource Strain (CARDIA)     Difficulty of Paying Living Expenses: Not hard at all   Food Insecurity: No Food Insecurity (2019)    Hunger Vital Sign     Worried About Running Out of Food in the Last Year: Never true     Ran Out of Food in the Last Year: Never true   Transportation Needs: No Transportation Needs (2019)    PRAPARE - Transportation     Lack of Transportation (Medical): No     Lack of Transportation (Non-Medical): No   Physical Activity: Insufficiently Active (2019)    Exercise Vital Sign     Days of Exercise per Week: 4 days     Minutes of Exercise per Session: 30 min   Stress: No Stress Concern Present (2019)    [employer] - Occupational Stress Questionnaire     Feeling of Stress : Not at all       Review of patient's allergies indicates:   Allergen Reactions    Norco [hydrocodone-acetaminophen] Shortness Of Breath     Chest heaviness and SOB    Ace inhibitors Other (See Comments)     cough    Pneumococcal vaccine Rash       Review of Systems:  Positive for anxiety, depression.  Balance of review of systems is negative.    Physical Exam:  Vitals:    24 1050   BP: (!) 176/72   Pulse: 71   Weight: 67.8 kg (149 lb 5.8 oz)   Height: 5' 4" (1.626 m)   PainSc:   5   PainLoc: Back     Body mass index is " 25.64 kg/m².    Gen: NAD  Psych: mood appropriate for given condition  HEENT: eyes anicteric   CV: RRR  HEENT: anicteric   Respiratory: non-labored, no signs of respiratory distress  Abd: non-distended  Skin: warm, dry and intact.  Gait: antalgic gait.     Sensory:  Intact and symmetrical to light touch in L1-S1 dermatomes bilaterally.    Motor:     Right Left   L2/3 Iliacus Hip flexion  5  5   L3/4 Qudratus Femoris Knee Extension  5  5   L4/5 Tib Anterior Ankle Dorsiflexion   5  5   L5/S1 Extensor Hallicus Longus Great toe extension  5  5   S1/S2 Gastroc/Soleus Plantar Flexion  5  5      Right Left                  Patellar DTR 0 0   Achilles DTR 0 0   Zhou Absent  Absent                 Labs:  Lab Results   Component Value Date    HGBA1C 5.8 (H) 04/08/2024       Lab Results   Component Value Date    WBC 9.10 04/08/2024    HGB 12.0 04/08/2024    HCT 35.6 (L) 04/08/2024    MCV 89 04/08/2024     04/08/2024           Imaging:  CT cervical spine 7/11/23  FINDINGS:  Bones: Vertebral body heights are preserved.  No fractures or bony destructive changes.     Alignment: Trace anterolisthesis of C4 on C5 and C5 on C6 with straightening of the expected normal cervical lordosis.     Craniocervical junction: Mild regional degenerative changes of the C1-C2 articulation.  No bony destructive changes or malalignment.     Disc levels:     C2-C3: Within normal limits.  No significant osseous spinal canal or foraminal narrowing.  C3-C4: Mild right-sided facet arthrosis.  Shallow central disc osteophyte complex.  The osseous spinal canal and foramina remain patent.  C4-C5: Mild bilateral facet arthrosis.  Shallow central disc osteophyte complex.  The osseous spinal canal and foramina remain patent.  C5-C6: Severe left-sided facet arthrosis with shallow uncovertebral spurring contributing to moderate to severe left foraminal narrowing.  Mild right foraminal narrowing.  No substantial narrowing of the osseous spinal  canal.  C6-C7: Mild bilateral facet arthrosis.  The osseous spinal canal and foramina are patent.  C7-T1: Within normal limits.  No significant osseous spinal canal or foraminal narrowing.     Paraspinal soft tissues: Mild emphysematous changes of the lung apices.    CT lumbar spine 7/11/23  FINDINGS:  Bones: Diffuse bony demineralization.  No fractures or bony destructive changes.  Endplate centered sclerosis and osteophyte production along the right lateral opposing endplates of L2-L3 in the setting of disc height loss discussed in further detail below.     Alignment: Prominent levo scoliotic curvature and left lateral subluxation of L2 on L3 with mild compensatory dextrocurvature of the lower lumbar spine.  Sagittal alignment is within normal limits without evidence of spondylolisthesis.     Disc levels:     T12-L1: Shallow disc bulging and mild bilateral facet arthrosis without substantial narrowing of the osseous spinal canal.  Mild to moderate right foraminal narrowing.  The left foramen is patent.  L1-L2: Shallow disc bulging without substantial narrowing of the osseous spinal canal.  Mild to moderate right foraminal narrowing.  The left foramen is patent.  L2-L3: Severe disc height loss asymmetric to the right.  Disc bulging and moderate facet arthrosis produce moderate to severe narrowing of the osseous spinal canal.  Severe right foraminal narrowing and mild left foraminal narrowing.  L3-L4: Disc bulging and mild-to-moderate facet arthrosis producing moderate narrowing of the spinal canal.  Lateralizing disc material contributes to moderate left foraminal narrowing.  Mild right foraminal narrowing.  L4-L5: Disc bulging and moderate facet arthrosis produces moderate narrowing of the osseous spinal canal.  Moderate to severe left and mild right foraminal narrowing.  L5-S1: Shallow disc bulging and moderate bilateral facet arthrosis.  No substantial narrowing of the osseous spinal canal.  Moderate left  foraminal narrowing.  The right foramen is patent.     Other: Right S2 sacral stimulator partially imaged.     Soft Tissues: Diverticulosis coli and atherosclerotic changes noted    Assessment:   Problem List Items Addressed This Visit    None  Visit Diagnoses       Lumbar radiculopathy    -  Primary    Left leg pain        Relevant Orders    US Lower Extremity Veins Left              Gretta Landry is a 78 y.o. female patient who has a past medical history of a Left Bundle Branch Block, a Nonischemic Cardiomyopathy With EF 40-45%, b Hypertension, c Hypercholesterolemia, e Hypothyroidism, f Osteoporosis, i Chronic Obstructive Pulmonary Disease, i H/O 1 PPD X 30 YRs TUD, Quit In 2006, j Family H/O Colon Cancer, j GERD, j H/O Colon Polyps On Previous TC, j Sigmoid And Descending Colon Diverticulosis, k Frequent Urinary Tract Infections, k H/O Bladder Suspension Surgery With Sling In 2014, l H/O Right Wrist Fracture Repair In 11/2015, o Allergic Rhinosinusitis, q Bilateral Lower Extremity Varicose Veins, q Vitamin D Deficiency, Sinus congestion, and Wellness Visit 12/1/16. She presents with back pain.  Had chronic back pain for over 10 years.  She reports over the past 6-12 months it has been gradually worsening.  She reports pain that radiates down her left leg the top of the left foot.  Her pain is 6/10, constant, deep, burning.  Pain is worse with standing, bending, touching, walking, lifting and relieved with rest, lying down, medications.  She does take some tramadol but it only provides her with about 10% relief.  She reports she has some feelings of chronic weakness.  She can intermittently gets some numbness down her left arm.      5/8/24 - Ms. Landry returns to the office for follow up.  Today she reports return of her lower back pain.  Her pain is constant, sharp, shooting radiating from her lower back to her left leg to her left foot.  Denies any numbness or weakness.    - on exam she has full strength in his  lower extremities and intact sensation to light touch bilateral L2-S1  - I independently reviewed her lumbar CT and at L4-5 she has at least moderate central canal narrowing with moderate-to-severe left foraminal narrowing  - over the past 6 months she has completed formal physical therapy and maintains PT directed home exercise program  - she is status post L5-S1 FUNMILAYO on 07/31/2023 with 100% relief of her previous similar left-sided radicular pain lasting for over 6 months  - her typical radicular pain has returned in his limiting her mobility and interfering with the quality of her life  - we will schedule for repeat L5-S1 FUNMILAYO.  The risks and benefits of this intervention, and alternative therapies were discussed with the patient.  The discussion of risks included infection, bleeding, need for additional procedures or surgery, nerve damage.  Questions regarding the procedure, risks, expected outcome, and possible side effects were solicited and answered to the patient's satisfaction.  Sarah Landry wishes to proceed with the injection or procedure.  Written consent was obtained.  - she takes aspirin preventatively, we will have her hold this prior to FUNMILAYO  - she noted some left lower extremity swelling.  I have ordered an ultrasound of the left lower extremity to rule out any DVT  - follow up 2-3 weeks post injection      : Not applicable    Marcell Hope M.D.  Interventional Pain Medicine / Anesthesiology    This note was completed with dictation software and grammatical errors may exist.

## 2024-05-08 NOTE — PROGRESS NOTES
[facility] Follow Up Evaluation      Referred by: No ref. provider found    PCP:     CC:   Chief Complaint   Patient presents with    Leg Pain     Left side, tingling/numbness present     Low-back Pain     Mid to low bilateral back pain radiating down left leg.    Mid-back Pain          5/8/2024    10:51 AM 8/29/2023    10:32 AM   Last 3 PDI Scores   Pain Disability Index (PDI) 11 7         Interval HPI 5/8/24: Ms. Landry returns to the office for follow up.  Today she reports return of her lower back pain.  Her pain is constant, sharp, shooting radiating from her lower back to her left leg to her left foot.  Denies any numbness or weakness.      HPI:   Gretta Landry is a 78 y.o. female patient who has a past medical history of a Left Bundle Branch Block, a Nonischemic Cardiomyopathy With EF 40-45%, b Hypertension, c Hypercholesterolemia, e Hypothyroidism, f Osteoporosis, i Chronic Obstructive Pulmonary Disease, i H/O 1 PPD X 30 YRs TUD, Quit In 2006, j Family H/O Colon Cancer, j GERD, j H/O Colon Polyps On Previous TC, j Sigmoid And Descending Colon Diverticulosis, k Frequent Urinary Tract Infections, k H/O Bladder Suspension Surgery With Sling In 2014, l H/O Right Wrist Fracture Repair In 11/2015, o Allergic Rhinosinusitis, q Bilateral Lower Extremity Varicose Veins, q Vitamin D Deficiency, Sinus congestion, and Wellness Visit 12/1/16. She presents with back pain.  Had chronic back pain for over 10 years.  She reports over the past 6-12 months it has been gradually worsening.  She reports pain that radiates down her left leg the top of the left foot.  Her pain is 6/10, constant, deep, burning.  Pain is worse with standing, bending, touching, walking, lifting and relieved with rest, lying down, medications.  She does take some tramadol but it only provides her with about 10% relief.  She reports she has some feelings of chronic weakness.  She can intermittently gets some numbness down her left  arm.      Pain Intervention History:  - s/p L5-S1 FUNMILAYO on 7/31/23 with 100% relief    Past Spine Surgical History:      Past and current medications:  Antineuropathics:  NSAIDs:  Physical therapy: yes, completed   Antidepressants:  Muscle relaxers:  Opioids: tramadol   Antiplatelets/Anticoagulants: aspirin     History:    Current Outpatient Medications:     aspirin (ECOTRIN) 81 MG EC tablet, Take 81 mg by mouth once daily., Disp: , Rfl:     cholecalciferol, vitamin D3, 10,000 unit Tab, Take 10,000 Units by mouth once a week. , Disp: , Rfl:     fish oil-omega-3 fatty acids 300-1,000 mg capsule, Take 2 g by mouth every other day. , Disp: , Rfl:     glucosamine-chondroitin 500-400 mg tablet, Take 1 tablet by mouth once daily., Disp: , Rfl:     niacin 500 MG CpSR, Take 1,000 mg by mouth 2 (two) times a day., Disp: , Rfl:     pantoprazole (PROTONIX) 40 MG tablet, Take 40 mg by mouth every other day., Disp: , Rfl:     valsartan-hydrochlorothiazide (DIOVAN-HCT) 320-25 mg per tablet, Take 1 tablet by mouth once daily., Disp: 90 tablet, Rfl: 3    verapamiL (CALAN-SR) 240 MG CR tablet, Take 1 tablet (240 mg total) by mouth every evening., Disp: 90 tablet, Rfl: 1    levothyroxine (SYNTHROID) 25 MCG tablet, Take 1 tablet (25 mcg total) by mouth before breakfast., Disp: 90 tablet, Rfl: 0    meclizine (ANTIVERT) 25 mg tablet, Take 25 mg by mouth 3 (three) times daily as needed. (Patient not taking: Reported on 5/8/2024), Disp: , Rfl:     Past Medical History:   Diagnosis Date    a Left Bundle Branch Block     Dr. Bertrand Greenberg    a Nonischemic Cardiomyopathy With EF 40-45%     Dr. Bertrand Greenberg; 10/25/17 LHC/Angiography = (See Report)    b Hypertension     5/29/18 RXd A Low Salt Diet; Norvasc Caused Severe Edema    c Hypercholesterolemia     On Niacin 500 Mg Daily    e Hypothyroidism     f Osteoporosis     i Chronic Obstructive Pulmonary Disease     i H/O 1 PPD X 30 YRs TUD, Quit In 2006     j Family H/O Colon Cancer     11/8/17  "Referred To Dr. Fernie Grace; Her Sister    j GERD     11/8/17 Referred To Dr. Fernie Grace; Dr. Loretta mcneal H/O Colon Polyps On Previous TC     11/8/17 Referred To Dr. Fernie Grace; Dr. Loretta Quiroz (After Her 1/21/10 TC Was Free Of Polyps): "Repeat TC In 5 YRs"    j Sigmoid And Descending Colon Diverticulosis     11/8/17 Referred To Dr. Fernie Grace; Dr. Loretta ledesma Frequent Urinary Tract Infections     k H/O Bladder Suspension Surgery With Sling In 2014     l H/O Right Wrist Fracture Repair In 11/2015     Dr. Dylan Packer; Injuries Due To A Fall On 11/2/15    o Allergic Rhinosinusitis     q Bilateral Lower Extremity Varicose Veins     q Vitamin D Deficiency     On OTC D3 10K IU Daily    Sinus congestion     Wellness Visit 12/1/16     Was Performed By Dr. Teran       Past Surgical History:   Procedure Laterality Date    APPENDECTOMY      CARDIAC CATHETERIZATION  2006    [facility], no stents    CATARACT EXTRACTION, BILATERAL  2019    Cysto, bladder lift with sling and robotic sacral colpopexy-1/31/14      EPIDURAL STEROID INJECTION INTO LUMBAR SPINE N/A 7/31/2023    Procedure: Injection-steroid-epidural-lumbar L5/S1;  Surgeon: Marcell Hope MD;  Location: [facility] OR;  Service: Pain Management;  Laterality: N/A;    HYSTERECTOMY      IMPLANTATION OF PERMANENT SACRAL NERVE STIMULATOR N/A 11/27/2018    Procedure: INSERTION, NEUROSTIMULATOR, PERMANENT, SACRAL;  Surgeon: Jean Carlos Denton MD;  Location: [facility] OR;  Service: Urology;  Laterality: N/A;    OOPHORECTOMY      right wrist      Fracture    VAGINAL HYSTERECTOMY W/ ANTERIOR AND POSTERIOR VAGINAL REPAIR  2006    and BSO       Family History   Problem Relation Name Age of Onset    No Known Problems Mother      Heart disease Father      Hyperlipidemia Father      Hypertension Father      Stroke Father      Ovarian cancer Sister      Lung cancer Brother      Alcohol abuse Brother      Dementia Sister         Social History     Socioeconomic History    " "Marital status:    Tobacco Use    Smoking status: Former     Current packs/day: 0.00     Average packs/day: 1 pack/day for 30.0 years (30.0 ttl pk-yrs)     Types: Cigarettes     Start date: 1976     Quit date: 2006     Years since quittin.9    Smokeless tobacco: Never   Substance and Sexual Activity    Alcohol use: Yes     Alcohol/week: 1.0 standard drink of alcohol     Types: 1 Glasses of wine per week     Comment: rare    Drug use: No    Sexual activity: Yes     Partners: Male     Birth control/protection: Post-menopausal     Social Determinants of Health     Financial Resource Strain: Low Risk  (2019)    Overall Financial Resource Strain (CARDIA)     Difficulty of Paying Living Expenses: Not hard at all   Food Insecurity: No Food Insecurity (2019)    Hunger Vital Sign     Worried About Running Out of Food in the Last Year: Never true     Ran Out of Food in the Last Year: Never true   Transportation Needs: No Transportation Needs (2019)    PRAPARE - Transportation     Lack of Transportation (Medical): No     Lack of Transportation (Non-Medical): No   Physical Activity: Insufficiently Active (2019)    Exercise Vital Sign     Days of Exercise per Week: 4 days     Minutes of Exercise per Session: 30 min   Stress: No Stress Concern Present (2019)    [employer] - Occupational Stress Questionnaire     Feeling of Stress : Not at all       Review of patient's allergies indicates:   Allergen Reactions    Norco [hydrocodone-acetaminophen] Shortness Of Breath     Chest heaviness and SOB    Ace inhibitors Other (See Comments)     cough    Pneumococcal vaccine Rash       Review of Systems:  Positive for anxiety, depression.  Balance of review of systems is negative.    Physical Exam:  Vitals:    24 1050   BP: (!) 176/72   Pulse: 71   Weight: 67.8 kg (149 lb 5.8 oz)   Height: 5' 4" (1.626 m)   PainSc:   5   PainLoc: Back     Body mass index is " 25.64 kg/m².    Gen: NAD  Psych: mood appropriate for given condition  HEENT: eyes anicteric   CV: RRR  HEENT: anicteric   Respiratory: non-labored, no signs of respiratory distress  Abd: non-distended  Skin: warm, dry and intact.  Gait: antalgic gait.     Sensory:  Intact and symmetrical to light touch in L1-S1 dermatomes bilaterally.    Motor:     Right Left   L2/3 Iliacus Hip flexion  5  5   L3/4 Qudratus Femoris Knee Extension  5  5   L4/5 Tib Anterior Ankle Dorsiflexion   5  5   L5/S1 Extensor Hallicus Longus Great toe extension  5  5   S1/S2 Gastroc/Soleus Plantar Flexion  5  5      Right Left                  Patellar DTR 0 0   Achilles DTR 0 0   Zhou Absent  Absent                 Labs:  Lab Results   Component Value Date    HGBA1C 5.8 (H) 04/08/2024       Lab Results   Component Value Date    WBC 9.10 04/08/2024    HGB 12.0 04/08/2024    HCT 35.6 (L) 04/08/2024    MCV 89 04/08/2024     04/08/2024           Imaging:  CT cervical spine 7/11/23  FINDINGS:  Bones: Vertebral body heights are preserved.  No fractures or bony destructive changes.     Alignment: Trace anterolisthesis of C4 on C5 and C5 on C6 with straightening of the expected normal cervical lordosis.     Craniocervical junction: Mild regional degenerative changes of the C1-C2 articulation.  No bony destructive changes or malalignment.     Disc levels:     C2-C3: Within normal limits.  No significant osseous spinal canal or foraminal narrowing.  C3-C4: Mild right-sided facet arthrosis.  Shallow central disc osteophyte complex.  The osseous spinal canal and foramina remain patent.  C4-C5: Mild bilateral facet arthrosis.  Shallow central disc osteophyte complex.  The osseous spinal canal and foramina remain patent.  C5-C6: Severe left-sided facet arthrosis with shallow uncovertebral spurring contributing to moderate to severe left foraminal narrowing.  Mild right foraminal narrowing.  No substantial narrowing of the osseous spinal  canal.  C6-C7: Mild bilateral facet arthrosis.  The osseous spinal canal and foramina are patent.  C7-T1: Within normal limits.  No significant osseous spinal canal or foraminal narrowing.     Paraspinal soft tissues: Mild emphysematous changes of the lung apices.    CT lumbar spine 7/11/23  FINDINGS:  Bones: Diffuse bony demineralization.  No fractures or bony destructive changes.  Endplate centered sclerosis and osteophyte production along the right lateral opposing endplates of L2-L3 in the setting of disc height loss discussed in further detail below.     Alignment: Prominent levo scoliotic curvature and left lateral subluxation of L2 on L3 with mild compensatory dextrocurvature of the lower lumbar spine.  Sagittal alignment is within normal limits without evidence of spondylolisthesis.     Disc levels:     T12-L1: Shallow disc bulging and mild bilateral facet arthrosis without substantial narrowing of the osseous spinal canal.  Mild to moderate right foraminal narrowing.  The left foramen is patent.  L1-L2: Shallow disc bulging without substantial narrowing of the osseous spinal canal.  Mild to moderate right foraminal narrowing.  The left foramen is patent.  L2-L3: Severe disc height loss asymmetric to the right.  Disc bulging and moderate facet arthrosis produce moderate to severe narrowing of the osseous spinal canal.  Severe right foraminal narrowing and mild left foraminal narrowing.  L3-L4: Disc bulging and mild-to-moderate facet arthrosis producing moderate narrowing of the spinal canal.  Lateralizing disc material contributes to moderate left foraminal narrowing.  Mild right foraminal narrowing.  L4-L5: Disc bulging and moderate facet arthrosis produces moderate narrowing of the osseous spinal canal.  Moderate to severe left and mild right foraminal narrowing.  L5-S1: Shallow disc bulging and moderate bilateral facet arthrosis.  No substantial narrowing of the osseous spinal canal.  Moderate left  foraminal narrowing.  The right foramen is patent.     Other: Right S2 sacral stimulator partially imaged.     Soft Tissues: Diverticulosis coli and atherosclerotic changes noted    Assessment:   Problem List Items Addressed This Visit    None  Visit Diagnoses       Lumbar radiculopathy    -  Primary    Left leg pain        Relevant Orders    US Lower Extremity Veins Left              Gretta Landry is a 78 y.o. female patient who has a past medical history of a Left Bundle Branch Block, a Nonischemic Cardiomyopathy With EF 40-45%, b Hypertension, c Hypercholesterolemia, e Hypothyroidism, f Osteoporosis, i Chronic Obstructive Pulmonary Disease, i H/O 1 PPD X 30 YRs TUD, Quit In 2006, j Family H/O Colon Cancer, j GERD, j H/O Colon Polyps On Previous TC, j Sigmoid And Descending Colon Diverticulosis, k Frequent Urinary Tract Infections, k H/O Bladder Suspension Surgery With Sling In 2014, l H/O Right Wrist Fracture Repair In 11/2015, o Allergic Rhinosinusitis, q Bilateral Lower Extremity Varicose Veins, q Vitamin D Deficiency, Sinus congestion, and Wellness Visit 12/1/16. She presents with back pain.  Had chronic back pain for over 10 years.  She reports over the past 6-12 months it has been gradually worsening.  She reports pain that radiates down her left leg the top of the left foot.  Her pain is 6/10, constant, deep, burning.  Pain is worse with standing, bending, touching, walking, lifting and relieved with rest, lying down, medications.  She does take some tramadol but it only provides her with about 10% relief.  She reports she has some feelings of chronic weakness.  She can intermittently gets some numbness down her left arm.      5/8/24 - Ms. Landry returns to the office for follow up.  Today she reports return of her lower back pain.  Her pain is constant, sharp, shooting radiating from her lower back to her left leg to her left foot.  Denies any numbness or weakness.    - on exam she has full strength in his  lower extremities and intact sensation to light touch bilateral L2-S1  - I independently reviewed her lumbar CT and at L4-5 she has at least moderate central canal narrowing with moderate-to-severe left foraminal narrowing  - over the past 6 months she has completed formal physical therapy and maintains PT directed home exercise program  - she is status post L5-S1 FUNMILAYO on 07/31/2023 with 100% relief of her previous similar left-sided radicular pain lasting for over 6 months  - her typical radicular pain has returned in his limiting her mobility and interfering with the quality of her life  - we will schedule for repeat L5-S1 FUNMILAYO.  The risks and benefits of this intervention, and alternative therapies were discussed with the patient.  The discussion of risks included infection, bleeding, need for additional procedures or surgery, nerve damage.  Questions regarding the procedure, risks, expected outcome, and possible side effects were solicited and answered to the patient's satisfaction.  Sarah Landry wishes to proceed with the injection or procedure.  Written consent was obtained.  - she takes aspirin preventatively, we will have her hold this prior to FUNMILAYO  - she noted some left lower extremity swelling.  I have ordered an ultrasound of the left lower extremity to rule out any DVT  - follow up 2-3 weeks post injection      : Not applicable    Marcell Hope M.D.  Interventional Pain Medicine / Anesthesiology    This note was completed with dictation software and grammatical errors may exist.

## 2024-05-08 NOTE — TELEPHONE ENCOUNTER
Spoke with pt scheduled LESI procedure with Dr. Hope on 5/28/24, reviewed pre op instructions, scheduled follow up.

## 2024-05-10 ENCOUNTER — HOSPITAL ENCOUNTER (OUTPATIENT)
Dept: RADIOLOGY | Facility: HOSPITAL | Age: 79
Discharge: HOME | End: 2024-05-10
Attending: ANESTHESIOLOGY
Payer: MEDICARE

## 2024-05-10 DIAGNOSIS — M79.605 LEFT LEG PAIN: ICD-10-CM

## 2024-05-10 PROCEDURE — 93971 EXTREMITY STUDY: CPT | Mod: TC,PO,LT

## 2024-05-10 PROCEDURE — 93971 EXTREMITY STUDY: CPT | Mod: 26,LT,, | Performed by: RADIOLOGY

## 2024-05-28 ENCOUNTER — TELEPHONE (OUTPATIENT)
Dept: PAIN MEDICINE | Facility: CLINIC | Age: 79
End: 2024-05-28
Payer: MEDICARE

## 2024-05-28 ENCOUNTER — HOSPITAL ENCOUNTER (OUTPATIENT)
Dept: RADIOLOGY | Facility: HOSPITAL | Age: 79
Discharge: HOME | End: 2024-05-28
Attending: ANESTHESIOLOGY | Admitting: ANESTHESIOLOGY
Payer: MEDICARE

## 2024-05-28 DIAGNOSIS — M54.50 LOWER BACK PAIN: ICD-10-CM

## 2024-05-30 ENCOUNTER — HOSPITAL ENCOUNTER (OUTPATIENT)
Facility: HOSPITAL | Age: 79
Discharge: HOME | End: 2024-05-30
Attending: ANESTHESIOLOGY | Admitting: ANESTHESIOLOGY
Payer: MEDICARE

## 2024-05-30 ENCOUNTER — HOSPITAL ENCOUNTER (OUTPATIENT)
Dept: RADIOLOGY | Facility: HOSPITAL | Age: 79
Discharge: HOME | End: 2024-05-30
Attending: ANESTHESIOLOGY | Admitting: ANESTHESIOLOGY
Payer: MEDICARE

## 2024-05-30 VITALS
TEMPERATURE: 98 F | BODY MASS INDEX: 25.95 KG/M2 | RESPIRATION RATE: 18 BRPM | HEART RATE: 76 BPM | OXYGEN SATURATION: 96 % | HEIGHT: 64 IN | SYSTOLIC BLOOD PRESSURE: 170 MMHG | WEIGHT: 152 LBS | DIASTOLIC BLOOD PRESSURE: 79 MMHG

## 2024-05-30 DIAGNOSIS — M54.50 LOWER BACK PAIN: ICD-10-CM

## 2024-05-30 DIAGNOSIS — M54.16 LUMBAR RADICULOPATHY: Primary | ICD-10-CM

## 2024-05-30 PROCEDURE — 62323 NJX INTERLAMINAR LMBR/SAC: CPT | Mod: PO | Performed by: ANESTHESIOLOGY

## 2024-05-30 PROCEDURE — 62323 NJX INTERLAMINAR LMBR/SAC: CPT | Mod: ,,, | Performed by: ANESTHESIOLOGY

## 2024-05-30 PROCEDURE — 63600175 PHARM REV CODE 636 W HCPCS: Mod: PO | Performed by: ANESTHESIOLOGY

## 2024-05-30 PROCEDURE — 25500020 PHARM REV CODE 255: Mod: PO | Performed by: ANESTHESIOLOGY

## 2024-05-30 PROCEDURE — 76000 FLUOROSCOPY <1 HR PHYS/QHP: CPT | Mod: TC,PO

## 2024-05-30 PROCEDURE — 25000003 PHARM REV CODE 250: Mod: PO | Performed by: ANESTHESIOLOGY

## 2024-05-30 RX ORDER — LIDOCAINE HYDROCHLORIDE 10 MG/ML
INJECTION, SOLUTION EPIDURAL; INFILTRATION; INTRACAUDAL; PERINEURAL
Status: DISCONTINUED | OUTPATIENT
Start: 2024-05-30 | End: 2024-05-30 | Stop reason: HOSPADM

## 2024-05-30 RX ORDER — ALPRAZOLAM 0.5 MG/1
1 TABLET, ORALLY DISINTEGRATING ORAL ONCE AS NEEDED
Status: COMPLETED | OUTPATIENT
Start: 2024-05-30 | End: 2024-05-30

## 2024-05-30 RX ORDER — METHYLPREDNISOLONE ACETATE 80 MG/ML
INJECTION, SUSPENSION INTRA-ARTICULAR; INTRALESIONAL; INTRAMUSCULAR; SOFT TISSUE
Status: DISCONTINUED | OUTPATIENT
Start: 2024-05-30 | End: 2024-05-30 | Stop reason: HOSPADM

## 2024-05-30 RX ADMIN — ALPRAZOLAM 0.5 MG: 0.5 TABLET, ORALLY DISINTEGRATING ORAL at 02:05

## 2024-05-30 NOTE — DISCHARGE SUMMARY
[facility]  Discharge Note  Short Stay    Admit Date: 5/30/2024    Discharge Date: 5/30/2024    Attending Physician: Marcell Hope     Discharge Provider: Marcell Hope    Diagnoses:  There are no hospital problems to display for this patient.      Discharged Condition: Good    Final Diagnoses: Lumbar radiculopathy [M54.16]    Disposition: Home or Self Care    Hospital Course: No complications, uneventful    Outcome of Hospitalization, Treatment, Procedure, or Surgery:  Patient was admitted for outpatient interventional pain management procedure. The patient tolerated the procedure well with no complications.    Follow up/Patient Instructions:  Follow up as scheduled in Pain Management office in 2-3 weeks.  Patient has received instructions and follow up date and time.    Medications:  Continue previous medications, except restart aspirin in 24 hours    Discharge Procedure Orders   Notify your health care provider if you experience any of the following:  temperature >100.4     Notify your health care provider if you experience any of the following:  persistent nausea and vomiting or diarrhea     Notify your health care provider if you experience any of the following:  severe uncontrolled pain     Notify your health care provider if you experience any of the following:  redness, tenderness, or signs of infection (pain, swelling, redness, odor or green/yellow discharge around incision site)     Notify your health care provider if you experience any of the following:  difficulty breathing or increased cough     Notify your health care provider if you experience any of the following:  severe persistent headache     Notify your health care provider if you experience any of the following:  worsening rash     Notify your health care provider if you experience any of the following:  persistent dizziness, light-headedness, or visual disturbances     Notify your health care provider if you experience any of the  following:  increased confusion or weakness     Activity as tolerated

## 2024-05-30 NOTE — INTERVAL H&P NOTE
The patient has been examined and the H&P has been reviewed:    I concur with the findings and no changes have occurred since H&P was written.  She has held her aspirin appropriately.  U/S negative for DVT      There are no hospital problems to display for this patient.

## 2024-05-30 NOTE — OP NOTE
"Procedure Note    Procedure Date: 5/30/2024    Procedure Performed:  L5/S1 lumbar interlaminar epidural steroid injection under fluoroscopy.    Indications:  Gretta Landry presents with lumbar radiculitis/radiculopathy secondary to disc herniation, osteophyte/osteophyte complexes, and/or severe degenerative disc disease producing foraminal or central spinal stenosis.  The pain has been present for at least 4 weeks and the patient has failed to respond to noninvasive conservative care.  Pain rated by NRS at baseline prior to intervention is 6/10.  Their radiculitis/radiculopathy and/or neurogenic claudication is severe enough to greatly impact their quality of life or function.     Pre-op diagnosis: Lumbar Radiculopathy    Post-op diagnosis: same    Physician: Marcell Hope MD    IV anxiolysis medications: none    Medications injected: depomedrol 80mg, 1% Lidocaine 1ml, 2 mL sterile, preservative-free normal saline.    Local anesthetic used: 1% Lidocaine, 1 ml    Estimated Blood Loss: none    Complications:  none    Technique:  The patient was interviewed in the holding area and Risks/Benefits were discussed, including, but not limited to, the possibility of new or different pain, bleeding or infection.   All questions were answered.  The patient understood and accepted risks.  Consent was verfied.  A time-out was taken to identify patient and procedure prior to starting the procedure. The patient was placed in the prone position on the fluoroscopy table. The area of the lumbar spine was prepped with Chloraprep x2 and draped in a sterile manner. The L5/S1 interspace was identified and marked under AP fluoroscopy. The skin and subcutaneous tissues overlying the targeted interspace were anesthetized with 3-5 mL of 1% lidocaine using a 25G, 1.5" needle.  A 20G, 3.5" Tuohy epidural needle was directed toward the interspace under fluoroscopic guidance until the ligamentum flavum was engaged. From this point, a loss of " resistance technique with a glass syringe and saline was used to identify entrance of the needle into the epidural space. Once loss of resistance was observed 1 mL of contrast solution was injected. An appropriate epidurogram was noted.  A 4 mL mixture consisting of saline, 1 mL 1% Lidocaine and 80 mg of depomedrol was injected slowly and without resistance.  The needle was flushed with normal saline and removed. The contrast was seen to be displaced after injection. Patient was awake/responsive during all injections.  The patient tolerated the procedure well and was transferred to the [facility] in stable condition.  The patient was monitored after the procedure and was given post-procedure and discharge instructions to follow at home. The patient was discharged in a stable condition.

## 2024-06-05 ENCOUNTER — NURSE TRIAGE (OUTPATIENT)
Dept: ADMINISTRATIVE | Facility: CLINIC | Age: 79
End: 2024-06-05
Payer: MEDICARE

## 2024-06-05 PROBLEM — K92.2 LOWER GI BLEED: Status: ACTIVE | Noted: 2024-06-05

## 2024-06-05 PROBLEM — R73.03 PREDIABETES: Status: ACTIVE | Noted: 2024-06-05

## 2024-06-05 PROBLEM — K57.92 DIVERTICULITIS: Status: ACTIVE | Noted: 2024-06-05

## 2024-06-05 PROBLEM — M54.16 LUMBAR RADICULOPATHY: Status: ACTIVE | Noted: 2024-06-05

## 2024-06-05 NOTE — TELEPHONE ENCOUNTER
"Bleeding from the rectum.  Reason for Disposition   SEVERE rectal bleeding (large blood clots; constant or on and off bleeding)    Additional Information   Negative: Shock suspected (e.g., cold/pale/clammy skin, too weak to stand, low BP, rapid pulse)   Negative: Difficult to awaken or acting confused (e.g., disoriented, slurred speech)   Negative: Passed out (i.e., lost consciousness, collapsed and was not responding)   Negative: [1] Vomiting AND [2] contains red blood or black ("coffee ground") material  (Exception: Few red streaks in vomit that only happened once.)   Negative: Sounds like a life-threatening emergency to the triager   Negative: Diarrhea is main symptom   Negative: Stool color other than brown or tan is main concern  (no bleeding and no melena)    Protocols used: Rectal Bleeding-A-AH    "

## 2024-06-11 ENCOUNTER — OFFICE VISIT (OUTPATIENT)
Dept: PAIN MEDICINE | Facility: CLINIC | Age: 79
End: 2024-06-11
Payer: MEDICARE

## 2024-06-11 VITALS
HEART RATE: 85 BPM | WEIGHT: 147.19 LBS | BODY MASS INDEX: 25.13 KG/M2 | HEIGHT: 64 IN | SYSTOLIC BLOOD PRESSURE: 154 MMHG | DIASTOLIC BLOOD PRESSURE: 67 MMHG

## 2024-06-11 DIAGNOSIS — M54.16 LUMBAR RADICULOPATHY: Primary | ICD-10-CM

## 2024-06-11 DIAGNOSIS — M54.2 CERVICALGIA: ICD-10-CM

## 2024-06-11 DIAGNOSIS — M54.9 DORSALGIA, UNSPECIFIED: ICD-10-CM

## 2024-06-11 PROCEDURE — 1125F AMNT PAIN NOTED PAIN PRSNT: CPT | Mod: CPTII,S$GLB,,

## 2024-06-11 PROCEDURE — 3288F FALL RISK ASSESSMENT DOCD: CPT | Mod: CPTII,S$GLB,,

## 2024-06-11 PROCEDURE — 99213 OFFICE O/P EST LOW 20 MIN: CPT | Mod: S$GLB,,,

## 2024-06-11 PROCEDURE — 99999 PR PBB SHADOW E&M-EST. PATIENT-LVL III: CPT | Mod: PBBFAC,,,

## 2024-06-11 PROCEDURE — 1159F MED LIST DOCD IN RCRD: CPT | Mod: CPTII,S$GLB,,

## 2024-06-11 PROCEDURE — 3078F DIAST BP <80 MM HG: CPT | Mod: CPTII,S$GLB,,

## 2024-06-11 PROCEDURE — 1101F PT FALLS ASSESS-DOCD LE1/YR: CPT | Mod: CPTII,S$GLB,,

## 2024-06-11 PROCEDURE — 3077F SYST BP >= 140 MM HG: CPT | Mod: CPTII,S$GLB,,

## 2024-06-11 NOTE — PROGRESS NOTES
[facility] Follow Up Evaluation      Referred by: No ref. provider found    PCP:     CC:   Chief Complaint   Patient presents with    Low-back Pain          6/11/2024     9:48 AM 5/8/2024    10:51 AM 8/29/2023    10:32 AM   Last 3 PDI Scores   Pain Disability Index (PDI) 10 11 7       Interval HPI 6/11/2024: Gretta Landry returns to the office for follow up.  She is s/p L5/S1 FUNMILAYO on 05/30/2024 with 90% relief.  Overall, doing well and previous pain into left lower back and left leg has significantly improved.  She reports some continued chronic intermittent numbness over left foot but denies any new numbness, timothy weakness or  any bowel or bladder incontinence.  Recent hospitalization for blood in stool which has resolved.        HPI:   Gretta Landry is a 78 y.o. female patient who has a past medical history of a Left Bundle Branch Block, a Nonischemic Cardiomyopathy With EF 40-45%, b Hypertension, c Hypercholesterolemia, e Hypothyroidism, f Osteoporosis, i Chronic Obstructive Pulmonary Disease, i H/O 1 PPD X 30 YRs TUD, Quit In 2006, j Family H/O Colon Cancer, j GERD, j H/O Colon Polyps On Previous TC, j Sigmoid And Descending Colon Diverticulosis, k Frequent Urinary Tract Infections, k H/O Bladder Suspension Surgery With Sling In 2014, l H/O Right Wrist Fracture Repair In 11/2015, o Allergic Rhinosinusitis, q Bilateral Lower Extremity Varicose Veins, q Vitamin D Deficiency, Sinus congestion, and Wellness Visit 12/1/16. She presents with back pain.  Had chronic back pain for over 10 years.  She reports over the past 6-12 months it has been gradually worsening.  She reports pain that radiates down her left leg the top of the left foot.  Her pain is 6/10, constant, deep, burning.  Pain is worse with standing, bending, touching, walking, lifting and relieved with rest, lying down, medications.  She does take some tramadol but it only provides her with about 10% relief.  She reports she has some feelings of  chronic weakness.  She can intermittently gets some numbness down her left arm.      Pain Intervention History:  - s/p L5-S1 FUNMILAYO on 7/31/23 with 100% relief  - s/p L5/S1 FUNMILAYO on 05/30/2024 with 90% relief.    Past Spine Surgical History:      Past and current medications:  Antineuropathics:  NSAIDs:  Physical therapy: yes, completed   Antidepressants:  Muscle relaxers:  Opioids: tramadol   Antiplatelets/Anticoagulants: aspirin     History:    Current Outpatient Medications:     acetaminophen (TYLENOL) 500 MG tablet, Take 1,000 mg by mouth 2 (two) times daily as needed for Pain., Disp: , Rfl:     cholecalciferol, vitamin D3, 10,000 unit Tab, Take 10,000 Units by mouth once a week. , Disp: , Rfl:     ciprofloxacin HCl (CIPRO) 500 MG tablet, Take 1 tablet (500 mg total) by mouth every 12 (twelve) hours. for 9 days, Disp: 18 tablet, Rfl: 0    fish oil-omega-3 fatty acids 300-1,000 mg capsule, Take 1 g by mouth 2 (two) times a day., Disp: , Rfl:     glucosamine-chondroitin 500-400 mg tablet, Take 1 tablet by mouth once daily., Disp: , Rfl:     guaiFENesin (MUCINEX) 600 mg 12 hr tablet, Take 600 mg by mouth 2 (two) times daily as needed for Congestion., Disp: , Rfl:     levothyroxine (SYNTHROID) 25 MCG tablet, Take 1 tablet (25 mcg total) by mouth before breakfast., Disp: 90 tablet, Rfl: 0    LIDOcaine 4 % PtMd, Apply 1 patch topically daily as needed (back pain)., Disp: , Rfl:     metroNIDAZOLE (FLAGYL) 500 MG tablet, Take 1 tablet (500 mg total) by mouth every 8 (eight) hours. for 9 days, Disp: 27 tablet, Rfl: 0    niacin (NIASPAN) 1000 MG CR tablet, Take 1,000 mg by mouth 2 (two) times a day., Disp: , Rfl:     pantoprazole (PROTONIX) 40 MG tablet, Take 40 mg by mouth every other day., Disp: , Rfl:     valsartan-hydrochlorothiazide (DIOVAN-HCT) 320-25 mg per tablet, Take 1 tablet by mouth once daily., Disp: 90 tablet, Rfl: 3    verapamiL (VERELAN) 120 MG C24P, Take 1 capsule (120 mg total) by mouth 2 (two) times a day.,  "Disp: 180 capsule, Rfl: 0    Past Medical History:   Diagnosis Date    a Left Bundle Branch Block     Dr. Bertrand Greenberg    a Nonischemic Cardiomyopathy With EF 40-45%     Dr. Bertrand Greenberg; 10/25/17 LHC/Angiography = (See Report)    b Hypertension     5/29/18 RXd A Low Salt Diet; Norvasc Caused Severe Edema    c Hypercholesterolemia     On Niacin 500 Mg Daily    e Hypothyroidism     f Osteoporosis     i Chronic Obstructive Pulmonary Disease     i H/O 1 PPD X 30 YRs TUD, Quit In 2006     j Family H/O Colon Cancer     11/8/17 Referred To Dr. Fernie Grace; Her Sister    j GERD     11/8/17 Referred To Dr. Fernie Grace; Dr. Loretta mcneal H/O Colon Polyps On Previous TC     11/8/17 Referred To Dr. Fernie Grace; Dr. Loretta Quiroz (After Her 1/21/10 TC Was Free Of Polyps): "Repeat TC In 5 YRs"    j Sigmoid And Descending Colon Diverticulosis     11/8/17 Referred To Dr. Fernie Grace; Dr. Loretta ledesma Frequent Urinary Tract Infections     k H/O Bladder Suspension Surgery With Sling In 2014     l H/O Right Wrist Fracture Repair In 11/2015     Dr. Dylan Packer; Injuries Due To A Fall On 11/2/15    o Allergic Rhinosinusitis     q Bilateral Lower Extremity Varicose Veins     q Vitamin D Deficiency     On OTC D3 10K IU Daily    Sinus congestion     Wellness Visit 12/1/16     Was Performed By Dr. Teran       Past Surgical History:   Procedure Laterality Date    APPENDECTOMY      CARDIAC CATHETERIZATION  2006    [facility] [place], no stents    CATARACT EXTRACTION, BILATERAL  2019    Cysto, bladder lift with sling and robotic sacral colpopexy-1/31/14      EPIDURAL STEROID INJECTION INTO LUMBAR SPINE N/A 7/31/2023    Procedure: Injection-steroid-epidural-lumbar L5/S1;  Surgeon: Marcell Hope MD;  Location: [facility] OR;  Service: Pain Management;  Laterality: N/A;    EPIDURAL STEROID INJECTION INTO LUMBAR SPINE N/A 5/30/2024    Procedure: Injection-steroid-epidural-lumbar L5/S1;  Surgeon: Marcell Hope MD;  Location: [facility] OR;  " Service: Pain Management;  Laterality: N/A;  L5/S1    HYSTERECTOMY      IMPLANTATION OF PERMANENT SACRAL NERVE STIMULATOR N/A 2018    Procedure: INSERTION, NEUROSTIMULATOR, PERMANENT, SACRAL;  Surgeon: Jean Carlos Denton MD;  Location: [facility];  Service: Urology;  Laterality: N/A;    OOPHORECTOMY      right wrist      Fracture    VAGINAL HYSTERECTOMY W/ ANTERIOR AND POSTERIOR VAGINAL REPAIR  2006    and BSO       Family History   Problem Relation Name Age of Onset    No Known Problems Mother      Heart disease Father      Hyperlipidemia Father      Hypertension Father      Stroke Father      Ovarian cancer Sister      Lung cancer Brother      Alcohol abuse Brother      Dementia Sister         Social History     Socioeconomic History    Marital status:    Tobacco Use    Smoking status: Former     Current packs/day: 0.00     Average packs/day: 1 pack/day for 30.0 years (30.0 ttl pk-yrs)     Types: Cigarettes     Start date: 1976     Quit date: 2006     Years since quittin.0    Smokeless tobacco: Never   Substance and Sexual Activity    Alcohol use: Yes     Alcohol/week: 1.0 standard drink of alcohol     Types: 1 Glasses of wine per week     Comment: rare    Drug use: No    Sexual activity: Yes     Partners: Male     Birth control/protection: Post-menopausal     Social Determinants of Health     Financial Resource Strain: Low Risk  (2019)    Overall Financial Resource Strain (CARDIA)     Difficulty of Paying Living Expenses: Not hard at all   Food Insecurity: No Food Insecurity (2024)    Hunger Vital Sign     Worried About Running Out of Food in the Last Year: Never true     Ran Out of Food in the Last Year: Never true   Transportation Needs: No Transportation Needs (2024)    TRANSPORTATION NEEDS     Transportation : No   Physical Activity: Insufficiently Active (2019)    Exercise Vital Sign     Days of Exercise per Week: 4 days     Minutes of Exercise per Session: 30 min  "  Stress: No Stress Concern Present (9/18/2019)    [employer] [place] [employer] - Occupational Stress Questionnaire     Feeling of Stress : Not at all   Housing Stability: Low Risk  (6/6/2024)    Housing Stability Vital Sign     Unable to Pay for Housing in the Last Year: No     Homeless in the Last Year: No       Review of patient's allergies indicates:   Allergen Reactions    Norco [hydrocodone-acetaminophen] Shortness Of Breath     Chest heaviness and SOB    Ace inhibitors Other (See Comments)     cough    Pneumococcal vaccine Rash       Review of Systems:  Positive for anxiety, depression.  Balance of review of systems is negative.    Physical Exam:  Vitals:    06/11/24 0950   BP: (!) 154/67   Pulse: 85   Weight: 66.7 kg (147 lb 2.5 oz)   Height: 5' 4" (1.626 m)   PainSc:   1   PainLoc: Back       Body mass index is 25.26 kg/m².    Gen: NAD  Psych: mood appropriate for given condition  HEENT: eyes anicteric   CV: RRR  HEENT: anicteric   Respiratory: non-labored, no signs of respiratory distress  Abd: non-distended  Skin: warm, dry and intact.  Gait: antalgic gait.     Sensory:  Intact and symmetrical to light touch in L1-S1 dermatomes bilaterally.    Motor:     Right Left   L2/3 Iliacus Hip flexion  5  5   L3/4 Qudratus Femoris Knee Extension  5  5   L4/5 Tib Anterior Ankle Dorsiflexion   5  5   L5/S1 Extensor Hallicus Longus Great toe extension  5  5   S1/S2 Gastroc/Soleus Plantar Flexion  5  5      Right Left                  Patellar DTR 0 0   Achilles DTR 0 0   Zhou Absent  Absent                 Labs:  Lab Results   Component Value Date    HGBA1C 5.8 (H) 04/08/2024       Lab Results   Component Value Date    WBC 13.82 (H) 06/06/2024    HGB 9.4 (L) 06/06/2024    HCT 28.5 (L) 06/06/2024    MCV 93 06/06/2024     06/06/2024           Imaging:  CT cervical spine 7/11/23  FINDINGS:  Bones: Vertebral body heights are preserved.  No fractures or bony destructive changes.     Alignment: Trace " anterolisthesis of C4 on C5 and C5 on C6 with straightening of the expected normal cervical lordosis.     Craniocervical junction: Mild regional degenerative changes of the C1-C2 articulation.  No bony destructive changes or malalignment.     Disc levels:     C2-C3: Within normal limits.  No significant osseous spinal canal or foraminal narrowing.  C3-C4: Mild right-sided facet arthrosis.  Shallow central disc osteophyte complex.  The osseous spinal canal and foramina remain patent.  C4-C5: Mild bilateral facet arthrosis.  Shallow central disc osteophyte complex.  The osseous spinal canal and foramina remain patent.  C5-C6: Severe left-sided facet arthrosis with shallow uncovertebral spurring contributing to moderate to severe left foraminal narrowing.  Mild right foraminal narrowing.  No substantial narrowing of the osseous spinal canal.  C6-C7: Mild bilateral facet arthrosis.  The osseous spinal canal and foramina are patent.  C7-T1: Within normal limits.  No significant osseous spinal canal or foraminal narrowing.     Paraspinal soft tissues: Mild emphysematous changes of the lung apices.    CT lumbar spine 7/11/23  FINDINGS:  Bones: Diffuse bony demineralization.  No fractures or bony destructive changes.  Endplate centered sclerosis and osteophyte production along the right lateral opposing endplates of L2-L3 in the setting of disc height loss discussed in further detail below.     Alignment: Prominent levo scoliotic curvature and left lateral subluxation of L2 on L3 with mild compensatory dextrocurvature of the lower lumbar spine.  Sagittal alignment is within normal limits without evidence of spondylolisthesis.     Disc levels:     T12-L1: Shallow disc bulging and mild bilateral facet arthrosis without substantial narrowing of the osseous spinal canal.  Mild to moderate right foraminal narrowing.  The left foramen is patent.  L1-L2: Shallow disc bulging without substantial narrowing of the osseous spinal  canal.  Mild to moderate right foraminal narrowing.  The left foramen is patent.  L2-L3: Severe disc height loss asymmetric to the right.  Disc bulging and moderate facet arthrosis produce moderate to severe narrowing of the osseous spinal canal.  Severe right foraminal narrowing and mild left foraminal narrowing.  L3-L4: Disc bulging and mild-to-moderate facet arthrosis producing moderate narrowing of the spinal canal.  Lateralizing disc material contributes to moderate left foraminal narrowing.  Mild right foraminal narrowing.  L4-L5: Disc bulging and moderate facet arthrosis produces moderate narrowing of the osseous spinal canal.  Moderate to severe left and mild right foraminal narrowing.  L5-S1: Shallow disc bulging and moderate bilateral facet arthrosis.  No substantial narrowing of the osseous spinal canal.  Moderate left foraminal narrowing.  The right foramen is patent.     Other: Right S2 sacral stimulator partially imaged.     Soft Tissues: Diverticulosis coli and atherosclerotic changes noted    Assessment:   Problem List Items Addressed This Visit          Neuro    Lumbar radiculopathy - Primary     Other Visit Diagnoses       Dorsalgia, unspecified        Cervicalgia                    Gretta Landry is a 78 y.o. female patient who has a past medical history of a Left Bundle Branch Block, a Nonischemic Cardiomyopathy With EF 40-45%, b Hypertension, c Hypercholesterolemia, e Hypothyroidism, f Osteoporosis, i Chronic Obstructive Pulmonary Disease, i H/O 1 PPD X 30 YRs TUD, Quit In 2006, j Family H/O Colon Cancer, j GERD, j H/O Colon Polyps On Previous TC, j Sigmoid And Descending Colon Diverticulosis, k Frequent Urinary Tract Infections, k H/O Bladder Suspension Surgery With Sling In 2014, l H/O Right Wrist Fracture Repair In 11/2015, o Allergic Rhinosinusitis, q Bilateral Lower Extremity Varicose Veins, q Vitamin D Deficiency, Sinus congestion, and Wellness Visit 12/1/16. She presents with back pain.   Had chronic back pain for over 10 years.  She reports over the past 6-12 months it has been gradually worsening.  She reports pain that radiates down her left leg the top of the left foot.  Her pain is 6/10, constant, deep, burning.  Pain is worse with standing, bending, touching, walking, lifting and relieved with rest, lying down, medications.  She does take some tramadol but it only provides her with about 10% relief.  She reports she has some feelings of chronic weakness.  She can intermittently gets some numbness down her left arm.    6/11/2024: Gretta Landry returns to the office for follow up.  She is s/p L5/S1 FUNMILAYO on 05/30/2024 with 90% relief.  Overall, doing well and previous pain into left lower back and left leg has significantly improved.  She reports some continued chronic intermittent numbness over left foot but denies any new numbness, timothy weakness or  any bowel or bladder incontinence.  Recent hospitalization for blood in stool which has resolved.      - on exam she has full strength in his lower extremities and intact sensation to light touch bilateral L2-S1  - She is s/p L5/S1 FUNMILAYO on 05/30/2024 with 90% relief.  - I independently reviewed her lumbar CT and at L4-5 she has at least moderate central canal narrowing with moderate-to-severe left foraminal narrowing  -   She responded well to the repeat lumbar epidural.  -   Discussed maintaining her at-home PT directed exercises.  She finds added relief with Tylenol.  -   Follow up as needed      : Not applicable      This note was completed with dictation software and grammatical errors may exist.

## 2024-07-15 PROBLEM — Z00.01 ABNORMAL WELLNESS EXAM: Status: RESOLVED | Noted: 2024-04-10 | Resolved: 2024-07-15

## 2024-09-09 PROBLEM — K92.2 LOWER GI BLEED: Status: RESOLVED | Noted: 2024-06-05 | Resolved: 2024-09-09

## 2024-11-25 PROBLEM — R41.0 ACUTE CONFUSION: Status: ACTIVE | Noted: 2024-11-25

## 2024-11-25 PROBLEM — R30.0 DYSURIA: Status: ACTIVE | Noted: 2024-11-25

## 2024-12-06 PROBLEM — F32.A DEPRESSION: Status: ACTIVE | Noted: 2024-12-06

## 2024-12-18 PROBLEM — R25.1 TREMOR: Status: ACTIVE | Noted: 2024-12-18

## 2025-01-07 ENCOUNTER — TELEPHONE (OUTPATIENT)
Dept: NEUROLOGY | Facility: CLINIC | Age: 80
End: 2025-01-07
Payer: MEDICARE

## 2025-01-07 PROBLEM — K82.8 GALLBLADDER DILATATION: Status: ACTIVE | Noted: 2025-01-07

## 2025-01-07 PROBLEM — G93.41 ENCEPHALOPATHY, METABOLIC: Status: ACTIVE | Noted: 2025-01-07

## 2025-01-07 PROBLEM — E87.6 HYPOKALEMIA: Status: ACTIVE | Noted: 2025-01-07

## 2025-01-07 NOTE — TELEPHONE ENCOUNTER
Spoke with the pts  and son, Fab. They advised thatthey were currently at the Er with the pt due to recent decline (tremors, confusion). The pt was placed in a held spot for Jan 28th at 1:00 with Shanika Price, for tremor that developed[ed several months ago and is poorly managed. Directions provided.

## 2025-01-08 PROBLEM — R79.89 ELEVATED TROPONIN: Status: ACTIVE | Noted: 2025-01-08

## 2025-01-08 PROBLEM — R53.1 GENERALIZED WEAKNESS: Status: ACTIVE | Noted: 2025-01-08

## 2025-01-08 PROBLEM — A04.72 C. DIFFICILE COLITIS: Status: ACTIVE | Noted: 2025-01-08

## 2025-01-09 PROBLEM — Z73.6 LIMITATION OF ACTIVITY DUE TO DISABILITY: Status: ACTIVE | Noted: 2025-01-09

## 2025-01-10 PROBLEM — E43 SEVERE MALNUTRITION: Status: ACTIVE | Noted: 2025-01-10

## (undated) DEVICE — HANDLE SURG LIGHT NONRIGID

## (undated) DEVICE — APPLICATOR CHLORAPREP CLR 10.5

## (undated) DEVICE — GLOVE 7.5 PROTEXIS PI MICRO

## (undated) DEVICE — SOL SOD CHLORIDE 0.9% 10ML

## (undated) DEVICE — TRAY NERVE BLOCK